# Patient Record
Sex: MALE | Race: WHITE | Employment: FULL TIME | URBAN - METROPOLITAN AREA
[De-identification: names, ages, dates, MRNs, and addresses within clinical notes are randomized per-mention and may not be internally consistent; named-entity substitution may affect disease eponyms.]

---

## 2018-03-11 ENCOUNTER — HOSPITAL ENCOUNTER (INPATIENT)
Age: 38
LOS: 1 days | Discharge: HOME | DRG: 189 | End: 2018-03-12
Attending: EMERGENCY MEDICINE | Admitting: INTERNAL MEDICINE
Payer: COMMERCIAL

## 2018-03-11 ENCOUNTER — APPOINTMENT (OUTPATIENT)
Dept: GENERAL RADIOLOGY | Age: 38
DRG: 189 | End: 2018-03-11
Attending: NURSE PRACTITIONER
Payer: COMMERCIAL

## 2018-03-11 DIAGNOSIS — J45.901 ASTHMA WITH ACUTE EXACERBATION, UNSPECIFIED ASTHMA SEVERITY, UNSPECIFIED WHETHER PERSISTENT: Primary | ICD-10-CM

## 2018-03-11 PROBLEM — J45.909 ASTHMA: Status: ACTIVE | Noted: 2018-03-11

## 2018-03-11 LAB
ALBUMIN SERPL-MCNC: 3.8 G/DL (ref 3.5–5)
ALBUMIN/GLOB SERPL: 1 {RATIO} (ref 1.1–2.2)
ALP SERPL-CCNC: 65 U/L (ref 45–117)
ALT SERPL-CCNC: 59 U/L (ref 12–78)
ANION GAP SERPL CALC-SCNC: 8 MMOL/L (ref 5–15)
AST SERPL-CCNC: 24 U/L (ref 15–37)
BASOPHILS # BLD: 0 K/UL (ref 0–0.1)
BASOPHILS NFR BLD: 0 % (ref 0–1)
BILIRUB SERPL-MCNC: 0.5 MG/DL (ref 0.2–1)
BUN SERPL-MCNC: 5 MG/DL (ref 6–20)
BUN/CREAT SERPL: 4 (ref 12–20)
CALCIUM SERPL-MCNC: 8.8 MG/DL (ref 8.5–10.1)
CHLORIDE SERPL-SCNC: 106 MMOL/L (ref 97–108)
CO2 SERPL-SCNC: 27 MMOL/L (ref 21–32)
CREAT SERPL-MCNC: 1.21 MG/DL (ref 0.7–1.3)
DIFFERENTIAL METHOD BLD: ABNORMAL
EOSINOPHIL # BLD: 0.5 K/UL (ref 0–0.4)
EOSINOPHIL NFR BLD: 4 % (ref 0–7)
ERYTHROCYTE [DISTWIDTH] IN BLOOD BY AUTOMATED COUNT: 13.2 % (ref 11.5–14.5)
FLUAV AG NPH QL IA: NEGATIVE
FLUBV AG NOSE QL IA: NEGATIVE
GLOBULIN SER CALC-MCNC: 3.9 G/DL (ref 2–4)
GLUCOSE SERPL-MCNC: 115 MG/DL (ref 65–100)
HCT VFR BLD AUTO: 45.9 % (ref 36.6–50.3)
HGB BLD-MCNC: 15.2 G/DL (ref 12.1–17)
IMM GRANULOCYTES # BLD: 0 K/UL
IMM GRANULOCYTES NFR BLD AUTO: 0 %
LYMPHOCYTES # BLD: 0.9 K/UL (ref 0.8–3.5)
LYMPHOCYTES NFR BLD: 7 % (ref 12–49)
MCH RBC QN AUTO: 30.2 PG (ref 26–34)
MCHC RBC AUTO-ENTMCNC: 33.1 G/DL (ref 30–36.5)
MCV RBC AUTO: 91.3 FL (ref 80–99)
MONOCYTES # BLD: 0.4 K/UL (ref 0–1)
MONOCYTES NFR BLD: 3 % (ref 5–13)
NEUTS BAND NFR BLD MANUAL: 1 % (ref 0–6)
NEUTS SEG # BLD: 11.3 K/UL (ref 1.8–8)
NEUTS SEG NFR BLD: 85 % (ref 32–75)
NRBC # BLD: 0 K/UL (ref 0–0.01)
NRBC BLD-RTO: 0 PER 100 WBC
PLATELET # BLD AUTO: 257 K/UL (ref 150–400)
PMV BLD AUTO: 8.7 FL (ref 8.9–12.9)
POTASSIUM SERPL-SCNC: 3.5 MMOL/L (ref 3.5–5.1)
PROT SERPL-MCNC: 7.7 G/DL (ref 6.4–8.2)
RBC # BLD AUTO: 5.03 M/UL (ref 4.1–5.7)
RBC MORPH BLD: ABNORMAL
SODIUM SERPL-SCNC: 141 MMOL/L (ref 136–145)
WBC # BLD AUTO: 13.1 K/UL (ref 4.1–11.1)

## 2018-03-11 PROCEDURE — 71046 X-RAY EXAM CHEST 2 VIEWS: CPT

## 2018-03-11 PROCEDURE — 74011636637 HC RX REV CODE- 636/637: Performed by: NURSE PRACTITIONER

## 2018-03-11 PROCEDURE — 36415 COLL VENOUS BLD VENIPUNCTURE: CPT | Performed by: PHYSICIAN ASSISTANT

## 2018-03-11 PROCEDURE — 94640 AIRWAY INHALATION TREATMENT: CPT

## 2018-03-11 PROCEDURE — 74011250636 HC RX REV CODE- 250/636: Performed by: INTERNAL MEDICINE

## 2018-03-11 PROCEDURE — 94660 CPAP INITIATION&MGMT: CPT

## 2018-03-11 PROCEDURE — 74011250636 HC RX REV CODE- 250/636: Performed by: PHYSICIAN ASSISTANT

## 2018-03-11 PROCEDURE — 96365 THER/PROPH/DIAG IV INF INIT: CPT

## 2018-03-11 PROCEDURE — 65270000029 HC RM PRIVATE

## 2018-03-11 PROCEDURE — 65660000001 HC RM ICU INTERMED STEPDOWN

## 2018-03-11 PROCEDURE — 87804 INFLUENZA ASSAY W/OPTIC: CPT | Performed by: PHYSICIAN ASSISTANT

## 2018-03-11 PROCEDURE — 74011000250 HC RX REV CODE- 250: Performed by: PHYSICIAN ASSISTANT

## 2018-03-11 PROCEDURE — 80053 COMPREHEN METABOLIC PANEL: CPT | Performed by: PHYSICIAN ASSISTANT

## 2018-03-11 PROCEDURE — 77030013033 HC MSK BPAP/CPAP MMKA -B

## 2018-03-11 PROCEDURE — 74011000250 HC RX REV CODE- 250: Performed by: NURSE PRACTITIONER

## 2018-03-11 PROCEDURE — 99284 EMERGENCY DEPT VISIT MOD MDM: CPT

## 2018-03-11 PROCEDURE — 85025 COMPLETE CBC W/AUTO DIFF WBC: CPT | Performed by: PHYSICIAN ASSISTANT

## 2018-03-11 RX ORDER — MORPHINE SULFATE 2 MG/ML
2 INJECTION, SOLUTION INTRAMUSCULAR; INTRAVENOUS
Status: DISCONTINUED | OUTPATIENT
Start: 2018-03-11 | End: 2018-03-12 | Stop reason: HOSPADM

## 2018-03-11 RX ORDER — MAGNESIUM SULFATE HEPTAHYDRATE 40 MG/ML
2 INJECTION, SOLUTION INTRAVENOUS ONCE
Status: COMPLETED | OUTPATIENT
Start: 2018-03-11 | End: 2018-03-11

## 2018-03-11 RX ORDER — ENOXAPARIN SODIUM 100 MG/ML
40 INJECTION SUBCUTANEOUS EVERY 24 HOURS
Status: DISCONTINUED | OUTPATIENT
Start: 2018-03-11 | End: 2018-03-12 | Stop reason: HOSPADM

## 2018-03-11 RX ORDER — SODIUM CHLORIDE 9 MG/ML
75 INJECTION, SOLUTION INTRAVENOUS CONTINUOUS
Status: DISCONTINUED | OUTPATIENT
Start: 2018-03-11 | End: 2018-03-12

## 2018-03-11 RX ORDER — ALBUTEROL SULFATE 0.83 MG/ML
SOLUTION RESPIRATORY (INHALATION)
Status: DISCONTINUED
Start: 2018-03-11 | End: 2018-03-12

## 2018-03-11 RX ORDER — SODIUM CHLORIDE 0.9 % (FLUSH) 0.9 %
5-10 SYRINGE (ML) INJECTION EVERY 8 HOURS
Status: DISCONTINUED | OUTPATIENT
Start: 2018-03-11 | End: 2018-03-12 | Stop reason: HOSPADM

## 2018-03-11 RX ORDER — IPRATROPIUM BROMIDE AND ALBUTEROL SULFATE 2.5; .5 MG/3ML; MG/3ML
SOLUTION RESPIRATORY (INHALATION)
Status: DISCONTINUED
Start: 2018-03-11 | End: 2018-03-12

## 2018-03-11 RX ORDER — SODIUM CHLORIDE 0.9 % (FLUSH) 0.9 %
5-10 SYRINGE (ML) INJECTION AS NEEDED
Status: DISCONTINUED | OUTPATIENT
Start: 2018-03-11 | End: 2018-03-12 | Stop reason: HOSPADM

## 2018-03-11 RX ORDER — ALBUTEROL SULFATE 0.83 MG/ML
5 SOLUTION RESPIRATORY (INHALATION)
Status: DISCONTINUED | OUTPATIENT
Start: 2018-03-11 | End: 2018-03-12

## 2018-03-11 RX ORDER — PREDNISONE 20 MG/1
60 TABLET ORAL
Status: COMPLETED | OUTPATIENT
Start: 2018-03-11 | End: 2018-03-11

## 2018-03-11 RX ORDER — KETOROLAC TROMETHAMINE 30 MG/ML
30 INJECTION, SOLUTION INTRAMUSCULAR; INTRAVENOUS
Status: DISCONTINUED | OUTPATIENT
Start: 2018-03-11 | End: 2018-03-11

## 2018-03-11 RX ORDER — KETOROLAC TROMETHAMINE 30 MG/ML
30 INJECTION, SOLUTION INTRAMUSCULAR; INTRAVENOUS
Status: COMPLETED | OUTPATIENT
Start: 2018-03-11 | End: 2018-03-11

## 2018-03-11 RX ADMIN — METHYLPREDNISOLONE SODIUM SUCCINATE 40 MG: 40 INJECTION, POWDER, FOR SOLUTION INTRAMUSCULAR; INTRAVENOUS at 17:44

## 2018-03-11 RX ADMIN — KETOROLAC TROMETHAMINE 30 MG: 30 INJECTION, SOLUTION INTRAMUSCULAR at 20:13

## 2018-03-11 RX ADMIN — ALBUTEROL SULFATE 2.5 MG: 2.5 SOLUTION RESPIRATORY (INHALATION) at 18:19

## 2018-03-11 RX ADMIN — ALBUTEROL SULFATE: 2.5 SOLUTION RESPIRATORY (INHALATION) at 15:37

## 2018-03-11 RX ADMIN — ALBUTEROL SULFATE: 2.5 SOLUTION RESPIRATORY (INHALATION) at 17:40

## 2018-03-11 RX ADMIN — MAGNESIUM SULFATE HEPTAHYDRATE 2 G: 40 INJECTION, SOLUTION INTRAVENOUS at 16:20

## 2018-03-11 RX ADMIN — PREDNISONE 60 MG: 20 TABLET ORAL at 14:38

## 2018-03-11 RX ADMIN — SODIUM CHLORIDE 75 ML/HR: 900 INJECTION, SOLUTION INTRAVENOUS at 18:14

## 2018-03-11 RX ADMIN — ALBUTEROL SULFATE 1 DOSE: 2.5 SOLUTION RESPIRATORY (INHALATION) at 14:39

## 2018-03-11 RX ADMIN — ALBUTEROL SULFATE 5 MG: 2.5 SOLUTION RESPIRATORY (INHALATION) at 23:16

## 2018-03-11 RX ADMIN — ALBUTEROL SULFATE: 2.5 SOLUTION RESPIRATORY (INHALATION) at 17:19

## 2018-03-11 RX ADMIN — ALBUTEROL SULFATE: 2.5 SOLUTION RESPIRATORY (INHALATION) at 15:21

## 2018-03-11 RX ADMIN — AZITHROMYCIN MONOHYDRATE 500 MG: 500 INJECTION, POWDER, LYOPHILIZED, FOR SOLUTION INTRAVENOUS at 18:14

## 2018-03-11 RX ADMIN — ALBUTEROL SULFATE 5 MG: 2.5 SOLUTION RESPIRATORY (INHALATION) at 21:02

## 2018-03-11 RX ADMIN — Medication 10 ML: at 17:45

## 2018-03-11 RX ADMIN — ENOXAPARIN SODIUM 40 MG: 40 INJECTION SUBCUTANEOUS at 17:44

## 2018-03-11 RX ADMIN — ALBUTEROL SULFATE: 2.5 SOLUTION RESPIRATORY (INHALATION) at 15:57

## 2018-03-11 RX ADMIN — ALBUTEROL SULFATE: 2.5 SOLUTION RESPIRATORY (INHALATION) at 18:10

## 2018-03-11 NOTE — H&P
[address]  ACUTE CARE HISTORY AND PHYSICAL    Mindy Zarate  MR#: [id number]  : 1980  ACCOUNT #: [de-identified]   DATE OF SERVICE: 2018    CHIEF COMPLAINT:  Increasing shortness of breath for the last 2 days. HISTORY OF PRESENT ILLNESS:  The patient is a 56-year-old male with a past medical history of asthma since as a child. Since the last 3 years, as per the patient, the asthma is becoming more frequent. About 2 days back, had started having some chills and possible low-grade temperature and increasing shortness of breath. Has a nebulizer machine at home, which the patient has been taking frequently, has taken 2 breathing treatments. In spite of that, continued to become short of breath and had diffuse wheezing. As per the patient, did not have any chest pain or chest pressure. Denies any nausea, vomiting, diarrhea. PAST MEDICAL HISTORY:  Asthma. ALLERGIES:  NO KNOWN ALLERGIES. MEDICATIONS:  Is on nebulizer treatment, otherwise none. FAMILY HISTORY:  Unremarkable. SOCIAL HISTORY:  Nonsmoker, no alcohol use. REVIEW OF SYSTEMS   CONSTITUTIONAL:  Positive fevers, positive chills. EYES:  Negative visual problems, negative eye pain. HEENT:  Negative sore throat. Negative earaches. Negative postnasal drip. PULMONARY:  Positive wheezing. Positive shortness of breath. CARDIOVASCULAR:  Negative chest pain. GASTROINTESTINAL:  Negative abdominal pain. Negative diarrhea. PHYSICAL EXAMINATION   VITAL SIGNS:  As follows:  Sats of 92%, pulse rate of 115, blood pressure 168/104, temperature of 97.9. HEAD, EYES, EARS, NOSE, THROAT:  Pupils were equal, react to light. Oral mucosa moist.  NECK:  Supple. LUNGS:  Wheezing noted bilaterally, diffuse wheezing was noted all over the chest.  CARDIAC:  S1, S2 audible. No S3, S4.  ABDOMEN:  Soft, nontender. EXTREMITIES:  There was no edema.     LABORATORY DATA:  Showed a WBC count of 13.1, neutrophils of 85%. The CMP was within normal limits. ASSESSMENT AND PLAN:  The patient is a 16-year-old male who is here for the asthma exacerbations. Patient has received nebulizer treatment, which will be continued every 2 hours. Will be admitted to the [facility] because of the increased shortness of breath. Will continue to monitor. Also patient received magnesium sulfate in the ER, will also add Solu-Medrol to the current regime. 1.  Deep venous thrombosis prophylaxis, was given Lovenox. CODE STATUS:  FULL CODE.       MD RASTA De La Fuente / ROMERO  D: 03/11/2018 17:42     T: 03/11/2018 18:24  JOB #: [id number]

## 2018-03-11 NOTE — ED PROVIDER NOTES
HPI Comments: 40 y/o male with PMHx of asthma, presenting with complaint of shortness of breath. He states he came down with a cold 2 days ago, with associated nasal congestion, rhinorrhea and cough. Last night however he began to feel short of breath and wheezy with associated chest tightness. Symptoms are consistent with previous asthma exacerbations. He reports having to be admitted to the hospital twice for asthma, but has never been intubated. His chest tightness is rated 7/10, is constant, and radiates to his back. He reports fever, chills, and light-headedness, but denies nausea, vomiting or syncope. The history is provided by the patient. No past medical history on file. No past surgical history on file. No family history on file. Social History     Social History    Marital status: N/A     Spouse name: N/A    Number of children: N/A    Years of education: N/A     Occupational History    Not on file. Social History Main Topics    Smoking status: Not on file    Smokeless tobacco: Not on file    Alcohol use Not on file    Drug use: Not on file    Sexual activity: Not on file     Other Topics Concern    Not on file     Social History Narrative         ALLERGIES: Review of patient's allergies indicates no known allergies. Review of Systems   Constitutional: Positive for chills and fever. HENT: Positive for congestion and rhinorrhea. Respiratory: Positive for cough, chest tightness and shortness of breath. Cardiovascular: Positive for chest pain (tightness). Gastrointestinal: Negative for abdominal pain, nausea and vomiting. Musculoskeletal: Negative for myalgias. Skin: Negative for wound. Neurological: Positive for light-headedness. Negative for syncope. All other systems reviewed and are negative.       Vitals:    03/11/18 1430 03/11/18 1434   BP:  (!) 168/104   Pulse: (!) 103 99   Resp: 24 22   Temp: 97.9 °F (36.6 °C) 97.9 °F (36.6 °C)   SpO2: 93% Weight: 86.2 kg (190 lb) 86.2 kg (190 lb)   Height: 5' 11\" (1.803 m) 5' 11\" (1.803 m)            Physical Exam   Constitutional: He is oriented to person, place, and time. He appears well-developed and well-nourished. No distress. HENT:   Head: Normocephalic and atraumatic. Eyes: Conjunctivae and EOM are normal.   Neck: Normal range of motion. Neck supple. Cardiovascular: Regular rhythm and normal heart sounds. Tachycardia present. Pulmonary/Chest:   Diffuse rhonchi, diminished air movement with expiratory wheeze. No crackles. Increased work of breathing, patient able to speak in 3-4 word sentences, but no acute distress. Musculoskeletal: Normal range of motion. Neurological: He is alert and oriented to person, place, and time. Skin: Skin is warm and dry. He is not diaphoretic. Nursing note and vitals reviewed. MDM  Number of Diagnoses or Management Options  Asthma with acute exacerbation, unspecified asthma severity, unspecified whether persistent:      Amount and/or Complexity of Data Reviewed  Clinical lab tests: ordered and reviewed  Tests in the radiology section of CPT®: ordered and reviewed  Discuss the patient with other providers: yes (Dr. Jacki aCson, ED attending)    Patient Progress  Patient progress: stable        ED Course       Procedures      40 y/o male with PMHx of asthma, presenting with complaint of shortness of breath. History and exam consistent with asthma exacerbation vs less likely pneumonia. Will give prednisone, nebs and will obtain CXR. Progress Note - 3:51 PM   CXR negative for pneumonia or other acute abnormalities. Patient reassessed after 1 hour continuous nebs - work of breathing somewhat improved but lung exam unchanged, patient denies any subjective improvement. Will give magnesium. Progress Note - 4:53 PM   Patient reassessed 1 hour after magnesium administered, no acute distress but symptoms still not improved.  Will consult the hospitalist service for admission. Consult Note - 5:28 PM   I have spoken with Dr. Guadalupe Szymanski. Discussed patient's presentation, history, physical assessment, and available diagnostic results. He will write orders and admit the patient to the hospital. All available results have been reviewed with the patient and any available family. Care plan has been outlined and questions have been answered. Patient and any available family understands and agrees to need for admission to hospital for further treatment not available in ED. Patient is ready for admission.

## 2018-03-11 NOTE — IP AVS SNAPSHOT
110 [facility] [address] 
[phone] Patient: Christopher Blevins MRN: [id number] PQP:[id number] You are allergic to the following No active allergies Recent Documentation Height Weight BMI  
  
  
 1.803 m 86.2 kg 26.5 kg/m2 Emergency Contacts  (Rel.) Home Phone Work Phone Mobile Phone Parker Gan (Brother) [phone] -- -- About your hospitalization You were admitted on:  March 11, 2018 You last received care in the:  [facility] You were discharged on:  March 12, 2018 Why you were hospitalized Your primary diagnosis was:  Not on File Your diagnoses also included:  Asthma Providers Seen During Your Hospitalization Provider Specialty Primary office phone Sandrita Esparza MD Emergency Medicine [phone] Briana Boast, MD Internal Medicine [phone] Kamilla Holland MD Family Practice [phone] Your Primary Care Physician (PCP) Primary Care Physician Office Phone Office Fax NONE ** None ** ** None ** Follow-up Information Follow up With Details Comments Contact Info [facility] [address]. In 1 month Please call to schedule appointment  101 [facility] [address] [address] Leonardo Tilley NP On 3/15/2018 Hospital follow up new PCP appointment on Thursday, 3/15/18 @ 9:20 a.m. Patient needs to arrive 15 minutes early with photo ID, insurance card and a listing of all medications. [address] [place] [address] [address] 
[phone] Appointments for Next 14 days 3/15/2018  9:20 AM  [facility] Internal Medicine My Medications TAKE these medications as instructed Instructions Each Dose to Equal  
 Morning Noon Evening Bedtime  
 azithromycin 250 mg tablet Commonly known as:  Jen Yu  
   
 Your last dose was: Your next dose is: Take 1 Tab by mouth daily for 4 days. 250 mg  
    
   
   
   
  
 fluticasone-salmeterol 250-50 mcg/dose diskus inhaler Commonly known as:  ADVAIR DISKUS Your last dose was: Your next dose is: Take 1 Puff by inhalation every twelve (12) hours. 1 Puff  
    
   
   
   
  
 montelukast 10 mg tablet Commonly known as:  SINGULAIR Your last dose was: Your next dose is: Take 1 Tab by mouth daily. 10 mg  
    
   
   
   
  
 predniSONE 10 mg tablet Commonly known as:  Merwyn Going Your last dose was: Your next dose is:    
   
   
 6 tabs daily for 2 days then 5 tabs daily for 2 days then   4 tabs daily for 2 days then   3 tabs daily for 1 days then  2 tabs daily for 1 days then  1 tab daily for 1 days Where to Get Your Medications Information on where to get these meds will be given to you by the nurse or doctor. ! Ask your nurse or doctor about these medications  
  azithromycin 250 mg tablet  
 fluticasone-salmeterol 250-50 mcg/dose diskus inhaler  
 montelukast 10 mg tablet  
 predniSONE 10 mg tablet Discharge Instructions Discharge Instructions PATIENT ID: Raymon Roldan MRN: [id number] YOB: 1980 DATE OF ADMISSION: 3/11/2018  2:33 PM   
DATE OF DISCHARGE: 3/12/2018 PRIMARY CARE PROVIDER: None ATTENDING PHYSICIAN: Yesy Akhtar MD 
DISCHARGING PROVIDER: Kapil Kc NP To contact this individual call [phone] and ask the  to page. If unavailable ask to be transferred the Adult Hospitalist Department. DISCHARGE DIAGNOSES Asthma Exacerbation CONSULTATIONS: IP CONSULT TO HOSPITALIST 
 
PROCEDURES/SURGERIES: * No surgery found * PENDING TEST RESULTS:  
At the time of discharge the following test results are still pending: none FOLLOW UP APPOINTMENTS:  
Follow-up Information Follow up With Details Comments Contact Info None   None (395) Patient stated that they have no PCP [facility] . In 1 month Please call to schedule appointment  101 [facility] [address] [address] ADDITIONAL CARE RECOMMENDATIONS:  
 
1. Finish entire course of steroid taper as prescribed . 2. Finish Z pack for empiric treatment of bronchitis. 3. Start taking Advair + Singulair daily for asthma treatment. Please schedule follow up with [facility]. 4. You need treatment of your high blood pressure, please establish care with primary care provider , you will likely need medication to treat your blood pressure. DIET: Resume previous diet ACTIVITY: Activity as tolerated DISCHARGE MEDICATIONS: 
 See Medication Reconciliation Form · It is important that you take the medication exactly as they are prescribed. · Keep your medication in the bottles provided by the pharmacist and keep a list of the medication names, dosages, and times to be taken in your wallet. · Do not take other medications without consulting your doctor. NOTIFY YOUR PHYSICIAN FOR ANY OF THE FOLLOWING:  
Fever over 101 degrees for 24 hours. Chest pain, shortness of breath, fever, chills, nausea, vomiting, diarrhea, change in mentation, falling, weakness, bleeding. Severe pain or pain not relieved by medications. Or, any other signs or symptoms that you may have questions about. DISPOSITION: 
 X Home With: none OT  PT  [facility]  RN  
  
 SNF/Inpatient Rehab/LTAC Independent/assisted living Hospice Other: CDMP Checked: Yes X PROBLEM LIST Updated: Yes X Signed:  
Lyla Arrieta NP 
3/12/2018 
2:29 PM 
 
 
 
  
 
Discharge Orders None Introducing [facility]!    
 [facility] introduces [employer] patient portal. Now you can access parts of your medical record, email your doctor's office, and request medication refills online. 1. In your internet browser, go to [email or web address] 2. Click on the First Time User? Click Here link in the Sign In box. You will see the New Member Sign Up page. 3. Enter your [employer] Access Code exactly as it appears below. You will not need to use this code after youve completed the sign-up process. If you do not sign up before the expiration date, you must request a new code. · [employer] Access Code: [email or web address] Expires: 6/10/2018  2:34 PM 
 
4. Enter the last four digits of your Social Security Number (xxxx) and Date of Birth (mm/dd/yyyy) as indicated and click Submit. You will be taken to the next sign-up page. 5. Create a [employer] ID. This will be your [employer] login ID and cannot be changed, so think of one that is secure and easy to remember. 6. Create a [employer] password. You can change your password at any time. 7. Enter your Password Reset Question and Answer. This can be used at a later time if you forget your password. 8. Enter your e-mail address. You will receive e-mail notification when new information is available in [address] 9. Click Sign Up. You can now view and download portions of your medical record. 10. Click the Download Summary menu link to download a portable copy of your medical information. If you have questions, please visit the Frequently Asked Questions section of the [employer] website. Remember, [employer] is NOT to be used for urgent needs. For medical emergencies, dial 911. Now available from your iPhone and Android! General Information Please provide this summary of care documentation to your next provider. Patient Signature:  ____________________________________________________________ Date:  ____________________________________________________________  
  
Francesca Sleeper  Provider Signature: ____________________________________________________________ Date:  ____________________________________________________________

## 2018-03-11 NOTE — ED NOTES
Hospitalist at bedside for eval and admission into hospital. Pt O2 92% but pt still working hard to breath, MD placing orders for BIPAP, resp notified. Visit Vitals    /69    Pulse (!) 117    Temp 97.8 °F (36.6 °C)    Resp 18    Ht 5' 11\" (1.803 m)    Wt 86.2 kg (190 lb)    SpO2 92%    BMI 26.5 kg/m2         1815  Pt feeling much better on BIPAP, pt appears to be in less distress and RR 12. Will continue to monitor. Provided pt pillow, pt denies any further needs at this time.

## 2018-03-11 NOTE — PROGRESS NOTES

## 2018-03-11 NOTE — ED TRIAGE NOTES
TRIAGE: Pt arrives with c/o SOB and cp starting last night, unable to speak in complete sentences. Sats 93%.  States his asthma 'acts up when he gets sick and has had a cold since Friday'

## 2018-03-12 VITALS
OXYGEN SATURATION: 92 % | HEART RATE: 83 BPM | TEMPERATURE: 97.6 F | DIASTOLIC BLOOD PRESSURE: 83 MMHG | SYSTOLIC BLOOD PRESSURE: 142 MMHG | WEIGHT: 190 LBS | RESPIRATION RATE: 16 BRPM | HEIGHT: 71 IN | BODY MASS INDEX: 26.6 KG/M2

## 2018-03-12 PROCEDURE — 74011250636 HC RX REV CODE- 250/636: Performed by: INTERNAL MEDICINE

## 2018-03-12 PROCEDURE — 94660 CPAP INITIATION&MGMT: CPT

## 2018-03-12 PROCEDURE — 94762 N-INVAS EAR/PLS OXIMTRY CONT: CPT

## 2018-03-12 PROCEDURE — 77030029684 HC NEB SM VOL KT MONA -A

## 2018-03-12 PROCEDURE — 94640 AIRWAY INHALATION TREATMENT: CPT

## 2018-03-12 PROCEDURE — 74011250637 HC RX REV CODE- 250/637: Performed by: NURSE PRACTITIONER

## 2018-03-12 PROCEDURE — 74011000250 HC RX REV CODE- 250: Performed by: INTERNAL MEDICINE

## 2018-03-12 PROCEDURE — 77010033678 HC OXYGEN DAILY

## 2018-03-12 RX ORDER — LISINOPRIL 10 MG/1
10 TABLET ORAL DAILY
Status: DISCONTINUED | OUTPATIENT
Start: 2018-03-12 | End: 2018-03-12 | Stop reason: HOSPADM

## 2018-03-12 RX ORDER — MONTELUKAST SODIUM 10 MG/1
10 TABLET ORAL DAILY
Qty: 30 TAB | Refills: 0 | Status: SHIPPED | OUTPATIENT
Start: 2018-03-12 | End: 2018-04-19 | Stop reason: SDUPTHER

## 2018-03-12 RX ORDER — AZITHROMYCIN 250 MG/1
250 TABLET, FILM COATED ORAL DAILY
Qty: 4 TAB | Refills: 0 | Status: SHIPPED | OUTPATIENT
Start: 2018-03-12 | End: 2018-03-16

## 2018-03-12 RX ORDER — FLUTICASONE PROPIONATE AND SALMETEROL 250; 50 UG/1; UG/1
1 POWDER RESPIRATORY (INHALATION) EVERY 12 HOURS
Qty: 1 INHALER | Refills: 0 | Status: SHIPPED | OUTPATIENT
Start: 2018-03-12 | End: 2018-04-19 | Stop reason: SDUPTHER

## 2018-03-12 RX ORDER — PREDNISONE 10 MG/1
TABLET ORAL
Qty: 36 TAB | Refills: 0 | Status: SHIPPED | OUTPATIENT
Start: 2018-03-12 | End: 2020-01-08 | Stop reason: ALTCHOICE

## 2018-03-12 RX ORDER — ALBUTEROL SULFATE 0.83 MG/ML
5 SOLUTION RESPIRATORY (INHALATION)
Status: DISCONTINUED | OUTPATIENT
Start: 2018-03-12 | End: 2018-03-12 | Stop reason: HOSPADM

## 2018-03-12 RX ADMIN — Medication 10 ML: at 13:22

## 2018-03-12 RX ADMIN — MORPHINE SULFATE 2 MG: 2 INJECTION, SOLUTION INTRAMUSCULAR; INTRAVENOUS at 00:06

## 2018-03-12 RX ADMIN — Medication 10 ML: at 00:06

## 2018-03-12 RX ADMIN — METHYLPREDNISOLONE SODIUM SUCCINATE 40 MG: 40 INJECTION, POWDER, FOR SOLUTION INTRAMUSCULAR; INTRAVENOUS at 05:30

## 2018-03-12 RX ADMIN — ALBUTEROL SULFATE 5 MG: 2.5 SOLUTION RESPIRATORY (INHALATION) at 01:03

## 2018-03-12 RX ADMIN — Medication 10 ML: at 05:31

## 2018-03-12 RX ADMIN — ALBUTEROL SULFATE 5 MG: 2.5 SOLUTION RESPIRATORY (INHALATION) at 07:51

## 2018-03-12 RX ADMIN — SODIUM CHLORIDE 75 ML/HR: 900 INJECTION, SOLUTION INTRAVENOUS at 00:14

## 2018-03-12 RX ADMIN — LISINOPRIL 10 MG: 10 TABLET ORAL at 15:10

## 2018-03-12 RX ADMIN — METHYLPREDNISOLONE SODIUM SUCCINATE 40 MG: 40 INJECTION, POWDER, FOR SOLUTION INTRAMUSCULAR; INTRAVENOUS at 00:06

## 2018-03-12 RX ADMIN — ALBUTEROL SULFATE 5 MG: 2.5 SOLUTION RESPIRATORY (INHALATION) at 03:52

## 2018-03-12 NOTE — PROGRESS NOTES
TRANSFER - IN REPORT:    Verbal report received from [address] [facility] [address]  RN(name) on Delisa Briseno  being received from [facility]) for routine progression of care      Report consisted of patients Situation, Background, Assessment and   Recommendations(SBAR). Information from the following report(s) SBAR, MAR, Recent Results and Med Rec Status was reviewed with the receiving nurse. Opportunity for questions and clarification was provided. Assessment completed upon patients arrival to unit and care assumed.

## 2018-03-12 NOTE — PROGRESS NOTES
TRANSFER - OUT REPORT:    Verbal report given to JEFFERSON Ramirez(name) on Mimi Jensen  being transferred to [facility](unit) for routine progression of care       Report consisted of patients Situation, Background, Assessment and   Recommendations(SBAR). Information from the following report(s) SBAR, Kardex, ED Summary, Intake/Output, MAR, Recent Results and Cardiac Rhythm NSR was reviewed with the receiving nurse. Lines:   Peripheral IV 03/11/18 Left Antecubital (Active)   Site Assessment Clean, dry, & intact 3/12/2018 11:14 AM   Phlebitis Assessment 0 3/12/2018 11:14 AM   Infiltration Assessment 0 3/12/2018 11:14 AM   Dressing Status Clean, dry, & intact 3/12/2018 11:14 AM   Dressing Type Transparent;Tape 3/12/2018 11:14 AM   Hub Color/Line Status Capped 3/12/2018 11:14 AM   Action Taken Open ports on tubing capped 3/12/2018 11:14 AM   Alcohol Cap Used Yes 3/12/2018 11:14 AM        Opportunity for questions and clarification was provided.       Patient transported with:   [place]

## 2018-03-12 NOTE — ROUTINE PROCESS
TRANSFER - OUT REPORT:    Verbal report given to Laura(name) on Lyubov Blas  being transferred to [facility]) for routine progression of care       Report consisted of patients Situation, Background, Assessment and   Recommendations(SBAR). Information from the following report(s) SBAR, ED Summary, Intake/Output, MAR and Recent Results was reviewed with the receiving nurse. Lines:   Peripheral IV 03/11/18 Left Antecubital (Active)   Site Assessment Clean, dry, & intact 3/11/2018  5:10 PM   Phlebitis Assessment 0 3/11/2018  5:10 PM   Infiltration Assessment 0 3/11/2018  5:10 PM   Dressing Status Clean, dry, & intact 3/11/2018  5:10 PM   Hub Color/Line Status Pink 3/11/2018  5:10 PM        Opportunity for questions and clarification was provided.       Patient transported with:  Monitor and RN and BIPAP

## 2018-03-12 NOTE — PROGRESS NOTES
Patient listed as not having a primary care physician. Hospital follow-up PCP transitional care appointment has been scheduled with Jerson Reyes NP for Thursday, 3/15/18 at 9:20 a.m. Patient needs to arrive 15 minutes early with photo ID, insurance card and a listing of all medications. Pending patient discharge.   Carl Blackwood, Care Management Specialist.

## 2018-03-12 NOTE — PROGRESS NOTES
2355  TRANSFER - IN REPORT:    Verbal report received from Salas Pérez (name) on Jonas Johnston  being received from ED(unit) for routine progression of care      Report consisted of patients Situation, Background, Assessment and   Recommendations(SBAR). Information from the following report(s) SBAR, Kardex and ED Summary was reviewed with the receiving nurse. Opportunity for questions and clarification was provided. Assessment completed upon patients arrival to unit and care assumed. Primary Nurse Robert Gómez RN and April, RN performed a dual skin assessment on this patient No impairment noted  Richard score is 23      0055  RT at bedside assessing pt.     0100  RT recommend modifying Q2 nebs to Q4hr based on pt assessment. MD paged. Albuterol 5mg treatments modified from scheduled Q2hr to Q4 hr. Will continue to monitor pt respiratory status closely. Pt on BIPAP resting comfortably, breathing is nonlabored. O2 sat 97%. 0200  RT took pt off BIPAP for a break. Pt stated he \"feels much better\". No longer has audible wheezing. Some expiratory wheezing noted with auscultation. Pt on room air, O2 sat 94%, breathing pattern regular. Will continue to monitor. 0730  Bedside and Verbal shift change report given to [address] JEFFERSON Liu (oncoming nurse) by Lana Kohli RN (offgoing nurse). Report included the following information SBAR, Kardex, ED Summary, Procedure Summary, Intake/Output, MAR, Recent Results and Cardiac Rhythm NSR/tach. Hourly rounding performed.

## 2018-03-12 NOTE — PROGRESS NOTES
Problem: Falls - Risk of  Goal: *Absence of Falls  Document Avelino Fall Risk and appropriate interventions in the flowsheet. Outcome: Progressing Towards Goal  Fall Risk Interventions:  Mobility Interventions: Assess mobility with egress test, Patient to call before getting OOB  Pt educated on fall prevention. Pt verbalizes understanding. Bedside avelino tool used with pt. Call bell and frequently used items within reach. Pt utilizes call bell appropriately for assistance. Problem: Asthma: Day 1  Goal: Medications  Outcome: Progressing Towards Goal  Pt receiving solumedrol and frequent albuterol treatments. Pt transferred to unit on BIPAP. After a few treatments and solumedrol administration pt is feeling much better and able to breathe more comfortably on room air. Goal: Respiratory  Outcome: Progressing Towards Goal  Pt transferred to unit with BIPAP orders. BIPAP worn for several hours which helped decrease pt work of breathing. At 2am pt requested to take off BIPAP. On room air pt breathing comfortably. No longer hear audible wheezing. Wheezing only heard on auscultation.

## 2018-03-12 NOTE — DISCHARGE INSTRUCTIONS
Discharge Instructions       PATIENT ID: Tiffanie Arce  MRN: [id number]   YOB: 1980    DATE OF ADMISSION: 3/11/2018  2:33 PM    DATE OF DISCHARGE: 3/12/2018    PRIMARY CARE PROVIDER: None     ATTENDING PHYSICIAN: Jackquline Goldberg, MD  DISCHARGING PROVIDER: Yessi Mccoy NP    To contact this individual call [phone] and ask the  to page. If unavailable ask to be transferred the Adult Hospitalist Department. DISCHARGE DIAGNOSES     Asthma Exacerbation     CONSULTATIONS: IP CONSULT TO HOSPITALIST    PROCEDURES/SURGERIES: * No surgery found *    PENDING TEST RESULTS:   At the time of discharge the following test results are still pending: none    FOLLOW UP APPOINTMENTS:   Follow-up Information     Follow up With Details Comments Contact Info    None   None (395) Patient stated that they have no PCP      [facility] . In 1 month Please call to schedule appointment  [facility]:     1. Finish entire course of steroid taper as prescribed . 2. Finish Z pack for empiric treatment of bronchitis. 3. Start taking Advair + Singulair daily for asthma treatment. Please schedule follow up with [facility]. 4. You need treatment of your high blood pressure, please establish care with primary care provider , you will likely need medication to treat your blood pressure. DIET: Resume previous diet    ACTIVITY: Activity as tolerated      DISCHARGE MEDICATIONS:   See Medication Reconciliation Form    · It is important that you take the medication exactly as they are prescribed. · Keep your medication in the bottles provided by the pharmacist and keep a list of the medication names, dosages, and times to be taken in your wallet. · Do not take other medications without consulting your doctor.        NOTIFY YOUR PHYSICIAN FOR ANY OF THE FOLLOWING:   Fever over 101 degrees for 24 hours. Chest pain, shortness of breath, fever, chills, nausea, vomiting, diarrhea, change in mentation, falling, weakness, bleeding. Severe pain or pain not relieved by medications. Or, any other signs or symptoms that you may have questions about. DISPOSITION:   X Home With: none   OT  PT  HH  RN       SNF/Inpatient Rehab/LTAC    Independent/assisted living    Hospice    Other:     CDMP Checked: Yes X     PROBLEM LIST Updated:   Yes X       Signed:   Susanne Herrera NP  3/12/2018  2:29 PM

## 2018-03-12 NOTE — CDMP QUERY
Please clarify if this patient is (was) being treated/managed for: Acute respiratory failure (POA) in the setting of Asthma with Acute Exacerbation; O2 Sats 89%, Resp. 22/24, SOB and unable to speak in complete sentences, requiring BiPAP and supplemental  Oxygen to maintain Sats >91%. => Other explanation of clinical findings  => Clinically Undetermined (no explanation for clinical findings)    The medical record reflects the following clinical findings, treatment, and risk factors. Risk Factors:  Asthma exacerbation    Clinical Indicators:  O2 Sats 89%, Resp. 22/24, SOB and unable to speak in complete sentences    Treatment: BiPAP and supplemental  Oxygen to maintain Sats >91%. Please clarify and document your clinical opinion in the progress notes and discharge summary including the definitive and/or presumptive diagnosis, (suspected or probable), related to the above clinical findings. Please include clinical findings supporting your diagnosis.     Thank you  Uma Stafford  [facility]  [phone]

## 2018-03-12 NOTE — PROGRESS NOTES
Primary Nurse Ilya Bustamante RN and Torie Aparicio RN performed a dual skin assessment on this patient No impairment noted  Richard score is 23

## 2018-03-12 NOTE — ADT AUTH CERT NOTES
Facility Name: [facility]            [facility], [address]  TLO:[id number]     Diagnosis       Asthma with acute exacerbation, unspecified asthma severity, unspecified whether persistent  ICD-10-CM: J45.901  ICD-9-CM: 493.92        Admit Provider: Dionicio Boxer, MD   NPI:  [id number]      Patient Demographics      Patient Name Sex  Address Phone     Shanon Billing Male 1980 [address] [phone] (Home)       Hospital Account      Name Acct ID Class Status Primary Coverage     Shanon Billing [id number] INPATIENT Open BLUE CROSS - [facility]              Guarantor Account (for Hospital Account [de-identified])      Name Relation to Pt Service Area Active? Acct Type     [facility] Yes Personal/Family     Address Phone           [phone] [phone](P)                Coverage Information (for Hospital Account [de-identified])      F/O Payor/Plan Subscriber  Subscriber Sex Precert #     [place] BL[place] OUT OF STATE [facility] CENTER      Subscriber Subscriber #     Shanon Billing [id number]     [facility] # Group Name     [id number]      Address Phone      NATHANIEL [address]      Policy Number Status Effective Date Benefits Phone     [id number] -  -     Auth/Cert                   Diagnosis         Codes Comments     Asthma with acute exacerbation, unspecified asthma severity, unspecified whether persistent  ICD-10-CM: J45.901  ICD-9-CM: 493.92        Admission Information - Patient Record Only      Arrival Date/Time: 2018 1426 Admit Date/Time: 2018 1433 IP Adm.  Date/Time: 2018 1734     Admission Type: Emergency Point of Origin: Non-health Care Facility/self Admit Category:      Means of Arrival: Car Primary Service: Medicine Secondary Service: N/A     Transfer Source:  Service Area: [address] [place] [address] Unit: [facility] 4 IMCU 2     Admit Provider: Jacob Connelly Juliana Bailey MD Attending Provider: Elbert Castleman, MD Referring Provider:        Admission Information      Attending Provider Admission Dx Admitted On     Madina Casillas MD  18     Service Isolation Code Status     MEDICINE  Full Code     Allergies           No Known Allergies         Admission Information      Unit/Bed: [address]  Service: MEDICINE     Admitting provider: Fly Sotomayor MD Phone: [phone]     Attending provider: Madina Casillas MD Phone: [phone]     PCP: None Phone:      Admission dx: Asthma Patient class: I     Admission type: ER         Patient Demographics      Patient Name [address] Sex  Address Phone   Francisco Barefoot Mickel Mortimer [id number] Male 1980 [address]  [address] [phone] (Home)       H&P Notes      H&P filed by Fly Sotomayor MD at 18 / Draft: Not Electronically Signed / documented on ED to Hosp-Admission (Current) from 3/11/2018 in [address] 2      Author: Fly Sotomayor MD Author Type: Physician Filed: 18     Completion Status: In Progress Availability: Unavailable Document ID: [id number]     Note Status: Unsigned Transcription Date of Service: 18       : Fly Sotomayor MD (Physician)          Transcription Details: H&P     Dictated By: Fly Sotomayor MD Dictated Date: Not found Dictated Time: Not found     Transcribed By: Not found Transcribed Date: 18 Transcribed Time:                       [facility]  ACUTE CARE HISTORY AND PHYSICAL     Shanae Luu  MR#: [id number]  : 1980  ACCOUNT #: [de-identified]   DATE OF SERVICE: 2018     CHIEF COMPLAINT:  Increasing shortness of breath for the last 2 days.     HISTORY OF PRESENT ILLNESS:  The patient is a 68-year-old male with a past medical history of asthma since as a child. Since the last 3 years, as per the patient, the asthma is becoming more frequent.   About 2 days back, had started having some chills and possible low-grade temperature and increasing shortness of breath. Has a nebulizer machine at home, which the patient has been taking frequently, has taken 2 breathing treatments. In spite of that, continued to become short of breath and had diffuse wheezing. As per the patient, did not have any chest pain or chest pressure. Denies any nausea, vomiting, diarrhea.     PAST MEDICAL HISTORY:  Asthma.     ALLERGIES:  NO KNOWN ALLERGIES.     MEDICATIONS:  Is on nebulizer treatment, otherwise none.       FAMILY HISTORY:  Unremarkable.     SOCIAL HISTORY:  Nonsmoker, no alcohol use.     REVIEW OF SYSTEMS   CONSTITUTIONAL:  Positive fevers, positive chills. EYES:  Negative visual problems, negative eye pain. HEENT:  Negative sore throat. Negative earaches. Negative postnasal drip. PULMONARY:  Positive wheezing. Positive shortness of breath. CARDIOVASCULAR:  Negative chest pain. GASTROINTESTINAL:  Negative abdominal pain. Negative diarrhea.      PHYSICAL EXAMINATION   VITAL SIGNS:  As follows:  Sats of 92%, pulse rate of 115, blood pressure 168/104, temperature of 97.9. HEAD, EYES, EARS, NOSE, THROAT:  Pupils were equal, react to light. Oral mucosa moist.  NECK:  Supple. LUNGS:  Wheezing noted bilaterally, diffuse wheezing was noted all over the chest.  CARDIAC:  S1, S2 audible. No S3, S4.  ABDOMEN:  Soft, nontender. EXTREMITIES:  There was no edema.     LABORATORY DATA:  Showed a WBC count of 13.1, neutrophils of 85%. The CMP was within normal limits.     ASSESSMENT AND PLAN:  The patient is a 29-year-old male who is here for the asthma exacerbations. Patient has received nebulizer treatment, which will be continued every 2 hours. Will be admitted to the [facility] because of the increased shortness of breath. Will continue to monitor. Also patient received magnesium sulfate in the ER, will also add Solu-Medrol to the current regime.     1.  Deep venous thrombosis prophylaxis, was given Lovenox.     CODE STATUS:  FULL CODE.        Robb Augustin MD         FR / PN  D: 2018 17:42     T: 2018 18:24  JOB #: [id number]                        Patient Demographics      Patient Name [address] Sex  Address Phone     Bruce Akhtar [id number] Male 1980 [address] [phone] (Home)         CSN:     [id number]         Admit Date: Admit Time Room Bed     Mar 11, 2018  2:33 PM [id number]       Attending Providers      Provider Pager From To     Gabo Mitchell MD  18     Ammon Turner MD  18     Nabeel Adame MD  18        Emergency Contact(s)      Name Relation Home Work Mobile     Parker Gan [phone]         Utilization Review         Asthma - Care Day 1 (3/11/2018) by Lanre Clark RN      Review Entered Review Status     3/12/2018 Completed     Details          Care Day: 1 Care Date: 3/11/2018 Level of Care: Intermediate Care     Guideline Day 1      Level Of Care     (X) Intermediate care, ICU, [C] or floor after emergency treatment     3/12/2018 9:01 AM EDT by Susanna Curran       Intermediate care               Clinical Status     (X) * Clinical Indications met [D]     (X) Wheezing, coughing, and dyspnea     3/12/2018 9:01 AM EDT by Susanna Curran       increasing SOB x 2 days               Activity     (X) Activity as tolerated     3/12/2018 9:01 AM EDT by Susanna Curran       up with assist               Routes     (X) IV fluids, medications     3/12/2018 9:01 AM EDT by Susanna Curran       Zithromax IV; Lovenox SC; toradol iv; mag iv;          (X) Diet as tolerated     3/12/2018 9:01 AM EDT by Susanna Curran       Regualr diet               Interventions     (X) Possible CXR     3/12/2018 9:01 AM EDT by Susanna Curran       IMPRESSION: No acute findings. Chronic appearing mild midthoracic vertebral compression fracture.  Clinical correlation recommended.          (X) Pulse oximetry     3/12/2018 9:01 AM EDT by Jelani Reynolds Aliyah Galvan       prn spot checks          (X) Possible oxygen     3/12/2018 9:01 AM EDT by Myron Riley       NC; Bipap 35% FiO2 started at 1822               Medications     (X) Inhaled  beta2-agonist     3/12/2018 9:01 AM EDT by Myron Riley       albuterol neb q20 min x7; Proventil neb q2h          (X) Systemic corticosteroids     3/12/2018 9:01 AM EDT by Myron Riley       prednisone po; Solu-medrol iv               3/12/2018 9:01 AM EDT by Myron Riley     Subject: Additional Clinical Information     VS: 97.8; 117; 18; 140/69; 92% 2L NC Labs: wbc 13.1; na 141; k 3.5; bun 5; cr 1.21                         * Milestone          Additional Notes     H&P:     DATE OF SERVICE: 03/11/2018           CHIEF COMPLAINT:  Increasing shortness of breath for the last 2 days.           HISTORY OF PRESENT ILLNESS:  The patient is a 82-year-old male with a past medical history of asthma since as a child. Shaila Silvestre the last 3 years, as per the patient, the asthma is becoming more frequent.  About 2 days back, had started having some chills and possible low-grade temperature and increasing shortness of breath.  Has a nebulizer machine at home, which the patient has been taking frequently, has taken 2 breathing treatments.  In spite of that, continued to become short of breath and had diffuse wheezing.  As per the patient, did not have any chest pain or chest pressure.  Denies any nausea, vomiting, diarrhea.          ASSESSMENT AND PLAN:  The patient is a 82-year-old male who is here for the asthma exacerbations.  Patient has received nebulizer treatment, which will be continued every 2 hours.  Will be admitted to the [facility] because of the increased shortness of breath.  Will continue to monitor.  Also patient received magnesium sulfate in the ER, will also add Solu-Medrol to the current regime.       1.  Deep venous thrombosis prophylaxis, was given Lovenox.           CODE STATUS:  FULL CODE.       Asthma - Clinical Indications for Admission to Inpatient Care by Aleksey Garcia RN      Review Entered Review Status     3/12/2018 Completed     Details          Clinical Indications for Admission to Inpatient Care     Most Recent : Yessy Villavicencio Most Recent Date: 3/12/2018 8:52 AM EDT     (X) Admission is indicated for 1 or more of the following  (1) (2) (3) (4):        (X) Hemodynamic instability        3/12/2018 8:52 AM EDT by Yessy Villavicencio          HR: 108; 114; 115; 117

## 2018-03-12 NOTE — ED NOTES
Bedside shift change report given to Kj Bedoya RN (oncoming nurse) by Rama Ricardo RN (offgoing nurse). Report included the following information SBAR, ED Summary, Intake/Output, MAR and Recent Results. Dinner tray made for pt and pt tolerated well and maintaining oxygen, pt medicated for back pain and awaiting hospital bed. Denies any further needs at this time.

## 2018-03-12 NOTE — PROGRESS NOTES
Problem: Falls - Risk of  Goal: *Absence of Falls  Document Avelino Fall Risk and appropriate interventions in the flowsheet.    Outcome: Progressing Towards Goal  Fall Risk Interventions:  Mobility Interventions: Patient to call before getting OOB         Medication Interventions: Teach patient to arise slowly

## 2018-03-13 NOTE — DISCHARGE SUMMARY
Discharge Summary       PATIENT ID: Kari Godoy  MRN: [id number]   YOB: 1980    DATE OF ADMISSION: 3/11/2018  2:33 PM    DATE OF DISCHARGE: 3/12/2018  PRIMARY CARE PROVIDER: None       DISCHARGING PHYSICIAN: Kade Banks NP    To contact this individual call [phone] and ask the  to page. If unavailable ask to be transferred the Adult Hospitalist Department. CONSULTATIONS: None    PROCEDURES/SURGERIES: * No surgery found *    [id number] [facility] COURSE:     The patient is a 61-year-old male with a past medical history of asthma since as a child. Since the last 3 years, as per the patient, the asthma is becoming more frequent. About 2 days back, had started having some chills and possible low-grade temperature and increasing shortness of breath. Has a nebulizer machine at home, which the patient has been taking frequently, has taken 2 breathing treatments. In spite of that, continued to become short of breath and had diffuse wheezing. As per the patient, did not have any chest pain or chest pressure. Denies any nausea, vomiting, diarrhea. On day of discharge Mr. Celia Gipson is feeling much better. He has been hospitalized for asthma exacerbation before and feels that he has improved enough to go home. He is having no wheezing on exam this afternoon, some posterior diminished breath sounds. He is able to walk and get around with minimal shortness of breath. He just moved here three weeks ago from [place] and feels he may need to start back on his chronic medications for asthma control ( Advair + singulair) . I gave him prescriptions for both with instructions to follow up with pulmonary. He was also given prescriptions for prednisone steroid taper, Azithromycin, and he was set up with a new PCP follow up appointment. He will need to have his blood pressure addressed and possibly started on medication for high blood pressure.      DISCHARGE DIAGNOSES / PLAN:      Acute Asthma Exacerbation:   - responded well to IV steroids and nebulizers   - advair + singulair scripts at discharge   - complete 5 days of Azithromycin for empiric bronchitis coverage   - 10 day steroid taper prescribed  - needs to establish with pulmonary here in [facility], information given for f/u     Acute respiratory failure (POA) in the setting of Asthma with Acute Exacerbation; O2 Sats 89%, Resp. 22/24, SOB and unable to speak in complete sentences, requiring BiPAP and supplemental  Oxygen to maintain Sats >91%.      Hypertension, undiagnosed:   - patient claims never having any issues with blood pressure and was being followed regularly with PCP in [place] . - will discharge with f/u with PCP for possible medication to be started   - SBP ranging 130-160 during admission        PENDING TEST RESULTS:   At the time of discharge the following test results are still pending: none    FOLLOW UP APPOINTMENTS:    Follow-up Information     Follow up With Details Comments Contact Info    [facility]. In 1 month Please call to schedule appointment  [facility] [address], NP On 3/15/2018 Hospital follow up new PCP appointment on Thursday, 3/15/18 @ 9:20 a.m. Patient needs to arrive 15 minutes early with photo ID, insurance card and a listing of all medications. [facility]  [phone]             ADDITIONAL CARE RECOMMENDATIONS:     1. Finish entire course of steroid taper as prescribed . 2. Finish Z pack for empiric treatment of bronchitis. 3. Start taking Advair + Singulair daily for asthma treatment. Please schedule follow up with [facility]. 4. You need treatment of your high blood pressure, please establish care with primary care provider , you will likely need medication to treat your blood pressure.      DIET: Resume previous diet    ACTIVITY: Activity as tolerated        DISCHARGE MEDICATIONS:  Discharge Medication List as of 3/12/2018  3:25 PM      START taking these medications    Details   predniSONE (DELTASONE) 10 mg tablet 6 tabs daily for 2 days then  5 tabs daily for 2 days then    4 tabs daily for 2 days then    3 tabs daily for 1 days then   2 tabs daily for 1 days then   1 tab daily for 1 days, Print, Disp-36 Tab, R-0      azithromycin (ZITHROMAX Z-LEAH) 250 mg tablet Take 1 Tab by mouth daily for 4 days. , Print, Disp-4 Tab, R-0      fluticasone-salmeterol (ADVAIR DISKUS) 250-50 mcg/dose diskus inhaler Take 1 Puff by inhalation every twelve (12) hours. , Print, Disp-1 Inhaler, R-0      montelukast (SINGULAIR) 10 mg tablet Take 1 Tab by mouth daily. , Print, Disp-30 Tab, R-0               NOTIFY YOUR PHYSICIAN FOR ANY OF THE FOLLOWING:   Fever over 101 degrees for 24 hours. Chest pain, shortness of breath, fever, chills, nausea, vomiting, diarrhea, change in mentation, falling, weakness, bleeding. Severe pain or pain not relieved by medications. Or, any other signs or symptoms that you may have questions about. DISPOSITION:  X  Home With: none   OT  PT  HH  RN       Long term SNF/Inpatient Rehab    Independent/assisted living    Hospice    Other:       PATIENT CONDITION AT DISCHARGE:     Functional status    Poor     Deconditioned    X Independent      Cognition    X Lucid     Forgetful     Dementia      Catheters/lines (plus indication)    Roy     PICC     PEG     None      Code status   X  Full code     DNR      PHYSICAL EXAMINATION AT DISCHARGE:    Physical Exam   Constitutional: He is oriented to person, place, and time. He appears well-developed and well-nourished. HENT:   Head: Normocephalic. Eyes: Pupils are equal, round, and reactive to light. Neck: Normal range of motion. Neck supple. Cardiovascular: Normal rate and regular rhythm. Exam reveals no gallop and no friction rub. No murmur heard.   Pulmonary/Chest: Effort normal. He has decreased breath sounds in the right upper field, the right middle field, the right lower field, the left upper field, the left middle field and the left lower field. Abdominal: Soft. Bowel sounds are normal.   Musculoskeletal: Normal range of motion. Neurological: He is alert and oriented to person, place, and time. Skin: Skin is warm and dry. Psychiatric: He has a normal mood and affect.          CHRONIC MEDICAL DIAGNOSES:  Problem List as of 3/12/2018  Never Reviewed          Codes Class Noted - Resolved    Asthma ICD-10-CM: X99.788  ICD-9-CM: 493.90  3/11/2018 - Present              Greater than 30 minutes were spent with the patient on counseling and coordination of care    Signed:   Eitan Oneal NP  3/13/2018  7:54 AM

## 2018-03-14 RX ORDER — PREDNISONE 20 MG/1
TABLET ORAL
Refills: 0 | COMMUNITY
Start: 2018-01-30 | End: 2018-03-15 | Stop reason: ALTCHOICE

## 2018-03-14 RX ORDER — ALBUTEROL SULFATE 0.83 MG/ML
SOLUTION RESPIRATORY (INHALATION)
Refills: 0 | COMMUNITY
Start: 2018-01-30 | End: 2018-03-15 | Stop reason: SDUPTHER

## 2018-03-15 ENCOUNTER — OFFICE VISIT (OUTPATIENT)
Dept: INTERNAL MEDICINE CLINIC | Age: 38
End: 2018-03-15

## 2018-03-15 VITALS
HEIGHT: 71 IN | RESPIRATION RATE: 18 BRPM | BODY MASS INDEX: 29.23 KG/M2 | WEIGHT: 208.8 LBS | OXYGEN SATURATION: 95 % | DIASTOLIC BLOOD PRESSURE: 81 MMHG | SYSTOLIC BLOOD PRESSURE: 130 MMHG | TEMPERATURE: 96.5 F | HEART RATE: 65 BPM

## 2018-03-15 DIAGNOSIS — J45.41 MODERATE PERSISTENT ASTHMA WITH ACUTE EXACERBATION: Primary | ICD-10-CM

## 2018-03-15 DIAGNOSIS — R03.0 ELEVATED BP WITHOUT DIAGNOSIS OF HYPERTENSION: ICD-10-CM

## 2018-03-15 RX ORDER — ALBUTEROL SULFATE 0.83 MG/ML
SOLUTION RESPIRATORY (INHALATION)
Qty: 24 EACH | Refills: 0 | Status: SHIPPED | OUTPATIENT
Start: 2018-03-15 | End: 2018-04-19 | Stop reason: SDUPTHER

## 2018-03-15 RX ORDER — ALBUTEROL SULFATE 90 UG/1
1 AEROSOL, METERED RESPIRATORY (INHALATION)
Qty: 1 INHALER | Refills: 1 | Status: SHIPPED | OUTPATIENT
Start: 2018-03-15 | End: 2020-01-08 | Stop reason: SDUPTHER

## 2018-03-15 NOTE — PATIENT INSTRUCTIONS
Asthma Attack: Care Instructions  Your Care Instructions    During an asthma attack, the airways swell and narrow. This makes it hard to breathe. Severe asthma attacks can be life-threatening, but you can help prevent them by keeping your asthma under control and treating symptoms before they get bad. Symptoms include being short of breath, having chest tightness, coughing, and wheezing. Noting and treating these symptoms can also help you avoid future trips to the emergency room. The doctor has checked you carefully, but problems can develop later. If you notice any problems or new symptoms, get medical treatment right away. Follow-up care is a key part of your treatment and safety. Be sure to make and go to all appointments, and call your doctor if you are having problems. It's also a good idea to know your test results and keep a list of the medicines you take. How can you care for yourself at home? · Follow your asthma action plan to prevent and treat attacks. If you don't have an asthma action plan, work with your doctor to create one. · Take your asthma medicines exactly as prescribed. Talk to your doctor right away if you have any questions about how to take them. ¨ Use your quick-relief medicine when you have symptoms of an attack. Quick-relief medicine is usually an albuterol inhaler. Some people need to use quick-relief medicine before they exercise. ¨ Take your controller medicine every day, not just when you have symptoms. Controller medicine is usually an inhaled corticosteroid. The goal is to prevent problems before they occur. Don't use your controller medicine to treat an attack that has already started. It doesn't work fast enough to help. ¨ If your doctor prescribed corticosteroid pills to use during an attack, take them exactly as prescribed. It may take hours for the pills to work, but they may make the episode shorter and help you breathe better.   ¨ Keep your quick-relief medicine with you at all times. · Talk to your doctor before using other medicines. Some medicines, such as aspirin, can cause asthma attacks in some people. · If you have a peak flow meter, use it to check how well you are breathing. This can help you predict when an asthma attack is going to occur. Then you can take medicine to prevent the asthma attack or make it less severe. · Do not smoke or allow others to smoke around you. Avoid smoky places. Smoking makes asthma worse. If you need help quitting, talk to your doctor about stop-smoking programs and medicines. These can increase your chances of quitting for good. · Learn what triggers an asthma attack for you, and avoid the triggers when you can. Common triggers include colds, smoke, air pollution, dust, pollen, mold, pets, cockroaches, stress, and cold air. · Avoid colds and the flu. Get a pneumococcal vaccine shot. If you have had one before, ask your doctor if you need a second dose. Get a flu vaccine every fall. If you must be around people with colds or the flu, wash your hands often. When should you call for help? Call 911 anytime you think you may need emergency care. For example, call if:  ? · You have severe trouble breathing. ?Call your doctor now or seek immediate medical care if:  ? · Your symptoms do not get better after you have followed your asthma action plan. ? · You have new or worse trouble breathing. ? · Your coughing and wheezing get worse. ? · You cough up dark brown or bloody mucus (sputum). ? · You have a new or higher fever. ? Watch closely for changes in your health, and be sure to contact your doctor if:  ? · You need to use quick-relief medicine on more than 2 days a week (unless it is just for exercise). ? · You cough more deeply or more often, especially if you notice more mucus or a change in the color of your mucus. ? · You are not getting better as expected. Where can you learn more?   Go to http://toño-vivek.info/. Enter M594 in the search box to learn more about \"Asthma Attack: Care Instructions. \"  Current as of: May 12, 2017  Content Version: 11.4  © 1966-3343 [employer]. Care instructions adapted under license by [employer] (which disclaims liability or warranty for this information). If you have questions about a medical condition or this instruction, always ask your healthcare professional. [address] any warranty or liability for your use of this information. Learning About Asthma Triggers  What are asthma triggers? When you have asthma, certain things can make your symptoms worse. These are called triggers. Learn what triggers an asthma attack for you, and avoid the triggers when you can. Common triggers include colds, smoke, air pollution, dust, pollen, pets, stress, and cold air. How do asthma triggers affect you? Triggers can make it harder for your lungs to work as they should. They can lead to sudden breathing problems and other symptoms. When you are around a trigger, an asthma attack is more likely. If your symptoms are severe, you may need emergency treatment or have to go to the hospital for treatment. What can you do to avoid triggers? The first thing is to know your triggers. When you are having symptoms, note the things around you that might be causing them. Then look for patterns that may be triggering your symptoms. Record your triggers on a piece of paper or in an asthma diary. When you have your list of possible triggers, work with your doctor to find ways to avoid them. Avoid colds and flu. Get a pneumococcal vaccine shot. If you have had one before, ask your doctor whether you need a second dose. Get a flu vaccine every year, as soon as it's available. If you must be around people with colds or the flu, wash your hands often. Here are some ways to avoid a few common triggers.   · Do not smoke or allow others to smoke around you. If you need help quitting, talk to your doctor about stop-smoking programs and medicines. These can increase your chances of quitting for good. · If there is a lot of pollution, pollen, or dust outside, stay at home and keep your windows closed. Use an air conditioner or air filter in your home. Check your local weather report or newspaper for air quality and pollen reports. What else should you know? · Take your controller medicine every day, not just when you have symptoms. It helps prevent problems before they occur. · Your doctor may suggest that you check how well your lungs are working by measuring your peak expiratory flow (PEF) throughout the day. Your PEF may drop when you are near things that trigger symptoms. Where can you learn more? Go to http://toño-vivek.info/. Enter Z301 in the search box to learn more about \"Learning About Asthma Triggers. \"  Current as of: May 12, 2017  Content Version: 11.4  © 8612-2961 [employer]. Care instructions adapted under license by [employer] (which disclaims liability or warranty for this information). If you have questions about a medical condition or this instruction, always ask your healthcare professional. [address] any warranty or liability for your use of this information. Asthma: Your Action Plan  Sample Action Plan    Controller medicine action plan  Fill in the blank spaces and boxes that apply for all sections. · Name of your controller medicine:  ¨ ____________________________________________  · How much of this medicine do you take? ¨ ____________________________________________  · How often do you take this medicine? ¨ ____________________________________________  · Other instructions?   ¨ ____________________________________________  Quick-relief medicine action plan  · Name of your quick-relief medicine:  ¨ ____________________________________________  · How much of this medicine do you take? ¨ ____________________________________________  · How often do you take this medicine? ¨ ____________________________________________  Asthma Zones  GREEN ZONE: This is where you want to be! Green zone symptoms  · You have no shortness of breath or chest tightness. You are not coughing or wheezing. · You can do all of your usual activities. · You sleep well at night. Green zone peak flow (if you use a peak flow meter)  · ______ or more (80% or more of your personal best)  Green zone actions (Check the boxes and fill in the blank spaces that apply.)  [ ] You take your controller medicine(s) every day. [ ] [place] are staying away from your asthma triggers. [ ] You take quick-relief medicine (called _____________________) ______ minutes before exercise. YELLOW ZONE: Your asthma is getting worse. Yellow zone symptoms  · You are short of breath or have chest tightness. You are coughing or wheezing. · You have symptoms that keep you up at night. · You can do some, but not all, of your usual activities. Yellow zone peak flow (if you use a peak flow meter)  · ______ to ______ (50% to 79% of your personal best)  Yellow zone actions (Check the boxes and fill in the blank spaces that apply.)  [ ] Take _____ puff(s) of quick-relief medicine called ______________________. Repeat _____ times. [ ] If your symptoms don't get better or your peak flow has not returned to the green zone in 1 hour, then:  · [ ] Take _____ puff(s) of medicine called ______________________. Take it ____ times a day. · [ ] Begin or increase treatment with corticosteroid pills. Take ______ mg of medicine called ____________________________ every __________. · [ ] Call your doctor at this number: ____________________. RED ZONE: Danger! Red zone symptoms  · You are very short of breath. · You can't do your usual activities.   · Quick-relief medicine doesn't help. Or your symptoms don't get better after 24 hours in the yellow zone. Red zone peak flow (if you use a peak flow meter)  · Less than _______ (less than 50% of your personal best)  Red zone actions (Check the boxes and fill in the blank spaces that apply.)  [ ] Take _____ puff(s) of quick-relief medicine called ____________________________. Repeat ______ times. [ ] Begin or increase treatment with corticosteroid pills. Take ________ mg now. [ ] Call your doctor at this number: _________________. If you can't contact your doctor, go to the emergency department. Call 911 or ___________________. [ ] Other numbers you might call are: ___________________________________. When should you call for help? Call 911 anytime you think you may need emergency care. For example, call if:  · You have severe trouble breathing. Call your doctor now or seek immediate medical care if:  · You are in the red zone of your asthma action plan. · You've used your quick-relief medicine but are still having trouble breathing. · You cough up blood. · You have new or worse trouble breathing. · You cough up dark brown or bloody mucus (sputum). Watch closely for changes in your health, and be sure to contact your doctor if:  · You need to use quick-relief medicine more than 2 days each week (unless it's just for exercise). · Your coughing and wheezing get worse. Follow-up care is a key part of your treatment and safety. Be sure to make and go to all appointments, and call your doctor if you are having problems. It's also a good idea to know your test results and keep a list of the medicines you take. Where can you learn more? Go to http://toño-vivek.info/. Enter 94 05 61 in the search box to learn more about \"Asthma: Your Action Plan. \"  Current as of: May 12, 2017  Content Version: 11.4  © 9974-7331 [employer].  Care instructions adapted under license by [employer] (which disclaims liability or warranty for this information). If you have questions about a medical condition or this instruction, always ask your healthcare professional. [address] any warranty or liability for your use of this information.

## 2018-03-15 NOTE — PROGRESS NOTES
Subjective:     Chief Complaint   Patient presents with    Follow-up     [facility], 3/11-3/12/18, Bronchitis, Asthmatic Attach    Hypertension     Concerns BP high    Back Pain       History of Present Illness    Cindy Cortez is a 41y.o. year old male who presents as a new patient to establish care. He moved to [address] [place] [address] 3 weeks ago for work. On 03/11/18 he was admitted to [facility] for an asthma exacerbation. He has a hx of the same. Never had to be intubated. He states while living in [place] his pulmonologist took him off of his Advair and told him he didn't need it anymore. In the hospital he was given nebs, IV steroids, and mag. During his admission his BP was elevated. He denies a hx of HTN. His BP is stable today. Will continue to monitor. He was discharged home with a medrol dose pack, singular, Advair, and a z-pack. He reports feeling better but continues to have a dry cough. NAD. He denies any other new complaints at this time. No CP, SOB, GI, or  symptoms. Reviewed medications, recent lab work and imaging with patient. Pt reports compliance with medications. Current Outpatient Prescriptions on File Prior to Visit   Medication Sig Dispense Refill    albuterol (PROVENTIL VENTOLIN) 2.5 mg /3 mL (0.083 %) nebulizer solution INHALE 3 ML 3 TIMES A DAY BY NEBULIZATION ROUTE.  0    predniSONE (DELTASONE) 10 mg tablet 6 tabs daily for 2 days then  5 tabs daily for 2 days then    4 tabs daily for 2 days then    3 tabs daily for 1 days then   2 tabs daily for 1 days then   1 tab daily for 1 days 36 Tab 0    azithromycin (ZITHROMAX Z-LEAH) 250 mg tablet Take 1 Tab by mouth daily for 4 days. 4 Tab 0    fluticasone-salmeterol (ADVAIR DISKUS) 250-50 mcg/dose diskus inhaler Take 1 Puff by inhalation every twelve (12) hours. 1 Inhaler 0    montelukast (SINGULAIR) 10 mg tablet Take 1 Tab by mouth daily. 30 Tab 0     No current facility-administered medications on file prior to visit.         No Known Allergies   Past Medical History:   Diagnosis Date    Anemia     Hypertension       History reviewed. No pertinent surgical history. Social History   Substance Use Topics    Smoking status: Never Smoker    Smokeless tobacco: Never Used    Alcohol use 0.6 oz/week     1 Cans of beer per week      Family History   Problem Relation Age of Onset    Asthma Mother     Cancer Mother     Hypertension Mother     Asthma Sister         Objective:     Vitals:    03/15/18 0925   BP: 130/81   Pulse: 65   Resp: 18   Temp: 96.5 °F (35.8 °C)   TempSrc: Oral   SpO2: 95%   Weight: 208 lb 12.8 oz (94.7 kg)   Height: 5' 11\" (1.803 m)       Review of Systems   Constitutional: Negative. HENT: Negative. Eyes: Negative. Respiratory: Positive for cough. Cardiovascular: Negative. Gastrointestinal: Negative. Genitourinary: Negative. Musculoskeletal: Negative. Skin: Negative. Neurological: Negative. Psychiatric/Behavioral: Negative. Physical Exam   Constitutional: He is oriented to person, place, and time. He appears well-developed and well-nourished. No distress. Well-appearing  male. NAD   HENT:   Head: Normocephalic and atraumatic. Eyes: Conjunctivae and EOM are normal. Pupils are equal, round, and reactive to light. Neck: Normal range of motion. Neck supple. Cardiovascular: Normal rate, regular rhythm and normal heart sounds. Pulmonary/Chest: Effort normal and breath sounds normal. No respiratory distress. He has no wheezes. Dry cough. Abdominal: He exhibits no distension. Musculoskeletal: Normal range of motion. He exhibits no edema, tenderness or deformity. Neurological: He is alert and oriented to person, place, and time. Skin: Skin is warm and dry. No rash noted. He is not diaphoretic. No erythema. No pallor. Psychiatric: He has a normal mood and affect. His behavior is normal.   Nursing note and vitals reviewed.       Assessment/ Plan:   Diagnoses and all orders for this visit:    1. Moderate persistent asthma with acute exacerbation   Will refill  -     albuterol (PROVENTIL VENTOLIN) 2.5 mg /3 mL (0.083 %) nebulizer solution; INHALE 3 ML 3 TIMES A DAY BY NEBULIZATION ROUTE.  -     albuterol (PROVENTIL HFA, VENTOLIN HFA, PROAIR HFA) 90 mcg/actuation inhaler; Take 1 Puff by inhalation every four (4) hours as needed for Wheezing. Continue with Advair, Singular, and medrol dose pack as prescribed. 2. Elevated BP without diagnosis of hypertension   BP stable in office today. Patient's plan of care has been reviewed with them. Patient and/or family have verbally conveyed their understanding and agreement of the patient's signs, symptoms, diagnosis, treatment and prognosis and additionally agree to follow up as recommended or return to [facility] should their condition change prior to follow-up. Discharge instructions have also been provided to the patient with some educational information regarding their diagnosis as well a list of reasons why they would want to return to the office prior to their follow-up appointment should their condition change. Follow-up in 4 months.

## 2018-03-15 NOTE — PROGRESS NOTES
Health Maintenance Due   Topic Date Due    Pneumococcal 19-64 Medium Risk (1 of 1 - PPSV23) 01/23/2001    DTaP/Tdap/Td series (1 - Tdap) 01/23/2003    Influenza Age 5 to Adult  08/01/2017       Chief Complaint   Patient presents with    Follow-up     [facility], 3/11-3/12/18, Bronchitis, Asthmatic Attach    Hypertension     Concerns BP high    Back Pain       1. Have you been to the ER, urgent care clinic since your last visit? Hospitalized since your last visit? Yes When: 3/11/18 Where: [facility] Reason for visit: Bronchitis & Asthma    2. Have you seen or consulted any other health care providers outside of the [facility] since your last visit? Include any pap smears or colon screening. No    3) Do you have an Advance Directive on file? no    4) Are you interested in receiving information on Advance Directives? NO      Patient is accompanied by self I have received verbal consent from Maryjane Villatoro to discuss any/all medical information while they are present in the room.

## 2018-03-15 NOTE — MR AVS SNAPSHOT
[address] [place] [address] [address] [facility] 13 
[phone] Patient: Vishnu Farris MRN: [id number] KTB:[id number] Visit Information Date & Time Provider Department Dept. Phone Encounter #  
 3/15/2018  9:20 AM Luz Beaulieu NP [facility] [phone] [id number] Upcoming Health Maintenance Date Due Pneumococcal 19-64 Medium Risk (1 of 1 - PPSV23) 1/23/2001 DTaP/Tdap/Td series (1 - Tdap) 1/23/2003 Influenza Age 5 to Adult 8/1/2017 Allergies as of 3/15/2018  Review Complete On: 3/15/2018 By: Luz Beaulieu NP No Known Allergies Current Immunizations  Never Reviewed No immunizations on file. Not reviewed this visit You Were Diagnosed With   
  
 Codes Comments Moderate persistent asthma with acute exacerbation    -  Primary ICD-10-CM: J45.41 
ICD-9-CM: 493.92 Vitals BP Pulse Temp Resp Height(growth percentile) Weight(growth percentile) 130/81 (BP 1 Location: Right arm, BP Patient Position: Sitting) 65 96.5 °F (35.8 °C) (Oral) 18 5' 11\" (1.803 m) 208 lb 12.8 oz (94.7 kg) SpO2 BMI Smoking Status 95% 29.12 kg/m2 Never Smoker Vitals History BMI and BSA Data Body Mass Index Body Surface Area  
 29.12 kg/m 2 2.18 m 2 Preferred Pharmacy Pharmacy Name Phone [id number] [place], [address] [facility] [phone] Your Updated Medication List  
  
   
This list is accurate as of 3/15/18  9:48 AM.  Always use your most recent med list.  
  
  
  
  
 * albuterol 2.5 mg /3 mL (0.083 %) nebulizer solution Commonly known as:  PROVENTIL VENTOLIN  
INHALE 3 ML 3 TIMES A DAY BY NEBULIZATION ROUTE. * albuterol 90 mcg/actuation inhaler Commonly known as:  PROVENTIL HFA, VENTOLIN HFA, PROAIR HFA Take 1 Puff by inhalation every four (4) hours as needed for Wheezing. azithromycin 250 mg tablet Commonly known as:  Sukhdeep Lush Take 1 Tab by mouth daily for 4 days. fluticasone-salmeterol 250-50 mcg/dose diskus inhaler Commonly known as:  ADVAIR DISKUS Take 1 Puff by inhalation every twelve (12) hours. montelukast 10 mg tablet Commonly known as:  SINGULAIR Take 1 Tab by mouth daily. predniSONE 10 mg tablet Commonly known as:  DELTASONE  
6 tabs daily for 2 days then 5 tabs daily for 2 days then   4 tabs daily for 2 days then   3 tabs daily for 1 days then  2 tabs daily for 1 days then  1 tab daily for 1 days * Notice: This list has 2 medication(s) that are the same as other medications prescribed for you. Read the directions carefully, and ask your doctor or other care provider to review them with you. Prescriptions Sent to Pharmacy Refills  
 albuterol (PROVENTIL VENTOLIN) 2.5 mg /3 mL (0.083 %) nebulizer solution 0 Sig: INHALE 3 ML 3 TIMES A DAY BY NEBULIZATION ROUTE. Class: Normal  
 Pharmacy: [facility]/pharmacy #[id number] Ph #: [phone]  
 albuterol (PROVENTIL HFA, VENTOLIN HFA, PROAIR HFA) 90 mcg/actuation inhaler 1 Sig: Take 1 Puff by inhalation every four (4) hours as needed for Wheezing. Class: Normal  
 Pharmacy: [address] [place] [address] Ph #: [phone] Route: Inhalation Introducing [facility]! Dear Ethan Bennett: 
Thank you for requesting a [employer] account. Our records indicate that you already have an active [employer] account. You can access your account anytime at [email or web address]/[email or web address] Did you know that you can access your hospital and ER discharge instructions at any time in [employer]? You can also review all of your test results from your hospital stay or ER visit. Additional Information If you have questions, please visit the Frequently Asked Questions section of the [employer] website at [email or web address] Remember, MyChart is NOT to be used for urgent needs. For medical emergencies, dial 911. Now available from your iPhone and Android! Please provide this summary of care documentation to your next provider. Your primary care clinician is listed as Kandace Ramesh. If you have any questions after today's visit, please call [phone].

## 2018-04-19 DIAGNOSIS — J45.41 MODERATE PERSISTENT ASTHMA WITH ACUTE EXACERBATION: ICD-10-CM

## 2018-04-20 RX ORDER — ALBUTEROL SULFATE 0.83 MG/ML
SOLUTION RESPIRATORY (INHALATION)
Qty: 24 EACH | Refills: 0 | Status: SHIPPED | OUTPATIENT
Start: 2018-04-20 | End: 2018-08-28 | Stop reason: SDUPTHER

## 2018-04-20 RX ORDER — MONTELUKAST SODIUM 10 MG/1
10 TABLET ORAL DAILY
Qty: 30 TAB | Refills: 0 | Status: SHIPPED | OUTPATIENT
Start: 2018-04-20 | End: 2018-06-09 | Stop reason: SDUPTHER

## 2018-04-20 RX ORDER — FLUTICASONE PROPIONATE AND SALMETEROL 250; 50 UG/1; UG/1
1 POWDER RESPIRATORY (INHALATION) EVERY 12 HOURS
Qty: 1 INHALER | Refills: 0 | Status: SHIPPED | OUTPATIENT
Start: 2018-04-20 | End: 2018-06-09 | Stop reason: SDUPTHER

## 2018-06-11 RX ORDER — MONTELUKAST SODIUM 10 MG/1
TABLET ORAL
Qty: 30 TAB | Refills: 0 | Status: SHIPPED | OUTPATIENT
Start: 2018-06-11 | End: 2018-07-09 | Stop reason: SDUPTHER

## 2018-06-11 RX ORDER — FLUTICASONE PROPIONATE AND SALMETEROL 50; 250 UG/1; UG/1
POWDER RESPIRATORY (INHALATION)
Qty: 60 EACH | Refills: 0 | Status: SHIPPED | OUTPATIENT
Start: 2018-06-11 | End: 2020-01-08 | Stop reason: SDUPTHER

## 2018-07-09 RX ORDER — MONTELUKAST SODIUM 10 MG/1
TABLET ORAL
Qty: 30 TAB | Refills: 0 | Status: SHIPPED | OUTPATIENT
Start: 2018-07-09 | End: 2018-08-07 | Stop reason: SDUPTHER

## 2018-08-07 RX ORDER — MONTELUKAST SODIUM 10 MG/1
TABLET ORAL
Qty: 30 TAB | Refills: 0 | Status: SHIPPED | OUTPATIENT
Start: 2018-08-07 | End: 2018-10-15 | Stop reason: SDUPTHER

## 2018-08-28 DIAGNOSIS — J45.41 MODERATE PERSISTENT ASTHMA WITH ACUTE EXACERBATION: ICD-10-CM

## 2018-08-28 RX ORDER — ALBUTEROL SULFATE 0.83 MG/ML
SOLUTION RESPIRATORY (INHALATION)
Qty: 25 EACH | Refills: 1 | Status: SHIPPED | OUTPATIENT
Start: 2018-08-28 | End: 2018-11-05 | Stop reason: SDUPTHER

## 2018-10-15 RX ORDER — MONTELUKAST SODIUM 10 MG/1
TABLET ORAL
Qty: 30 TAB | Refills: 0 | Status: SHIPPED | OUTPATIENT
Start: 2018-10-15 | End: 2018-12-08 | Stop reason: SDUPTHER

## 2018-11-05 DIAGNOSIS — J45.41 MODERATE PERSISTENT ASTHMA WITH ACUTE EXACERBATION: ICD-10-CM

## 2018-11-05 RX ORDER — ALBUTEROL SULFATE 0.83 MG/ML
SOLUTION RESPIRATORY (INHALATION)
Qty: 180 EACH | Refills: 1 | Status: SHIPPED | OUTPATIENT
Start: 2018-11-05 | End: 2020-01-08 | Stop reason: SDUPTHER

## 2018-12-10 RX ORDER — MONTELUKAST SODIUM 10 MG/1
TABLET ORAL
Qty: 30 TAB | Refills: 0 | Status: SHIPPED | OUTPATIENT
Start: 2018-12-10 | End: 2020-01-08 | Stop reason: SDUPTHER

## 2020-01-08 ENCOUNTER — OFFICE VISIT (OUTPATIENT)
Dept: INTERNAL MEDICINE CLINIC | Age: 40
End: 2020-01-08

## 2020-01-08 VITALS
TEMPERATURE: 97.4 F | HEIGHT: 71 IN | RESPIRATION RATE: 18 BRPM | HEART RATE: 66 BPM | WEIGHT: 211 LBS | DIASTOLIC BLOOD PRESSURE: 80 MMHG | SYSTOLIC BLOOD PRESSURE: 118 MMHG | OXYGEN SATURATION: 96 % | BODY MASS INDEX: 29.54 KG/M2

## 2020-01-08 DIAGNOSIS — Z00.00 ROUTINE ADULT HEALTH MAINTENANCE: ICD-10-CM

## 2020-01-08 DIAGNOSIS — E66.3 OVERWEIGHT (BMI 25.0-29.9): ICD-10-CM

## 2020-01-08 DIAGNOSIS — G47.30 SLEEP DISORDER BREATHING: ICD-10-CM

## 2020-01-08 DIAGNOSIS — R05.9 COUGH: ICD-10-CM

## 2020-01-08 DIAGNOSIS — J45.41 MODERATE PERSISTENT ASTHMA WITH ACUTE EXACERBATION: Primary | ICD-10-CM

## 2020-01-08 RX ORDER — MONTELUKAST SODIUM 10 MG/1
TABLET ORAL
Qty: 30 TAB | Refills: 0 | Status: SHIPPED | OUTPATIENT
Start: 2020-01-08 | End: 2020-02-04

## 2020-01-08 RX ORDER — ALBUTEROL SULFATE 0.83 MG/ML
SOLUTION RESPIRATORY (INHALATION)
Qty: 180 EACH | Refills: 1 | Status: SHIPPED | OUTPATIENT
Start: 2020-01-08 | End: 2021-01-15

## 2020-01-08 RX ORDER — ALBUTEROL SULFATE 90 UG/1
1 AEROSOL, METERED RESPIRATORY (INHALATION)
Qty: 1 INHALER | Refills: 1 | Status: SHIPPED | OUTPATIENT
Start: 2020-01-08 | End: 2021-01-15

## 2020-01-08 RX ORDER — FLUTICASONE PROPIONATE AND SALMETEROL 250; 50 UG/1; UG/1
POWDER RESPIRATORY (INHALATION)
Qty: 60 EACH | Refills: 0 | Status: SHIPPED | OUTPATIENT
Start: 2020-01-08 | End: 2020-02-04

## 2020-01-08 NOTE — PROGRESS NOTES
HISTORY OF PRESENT ILLNESS  Royal Chappell is a 42 y.o. male with a history of asthma that presents today for a follow up, asthma exacerbations and medication refill. Patient reports that in the last several weeks he has been having trouble not coughing, short of breath and increased wheezing during the night. States that in the last weeks he has been having to use his albuterol every night and multiple times throughout the day. States that within 10 minutes it becomes controlled, but the use has picked up. Reports an increase in snoring and partner states that it sounds like he is having \"labored breathing: when lying flat. States that sitting up makes it better. Was on Advair 2 times in the past and was weaned off. Tolerated and did well on it   Denies CP, GI or  concerns. Denies leg swelling   Received the flu shot  Denies fever and chills   Visit Vitals  /80 (BP 1 Location: Right arm, BP Patient Position: Sitting)   Pulse 66   Temp 97.4 °F (36.3 °C) (Oral)   Resp 18   Ht 5' 11\" (1.803 m)   Wt 211 lb (95.7 kg)   SpO2 96%   BMI 29.43 kg/m²     Past Medical History:   Diagnosis Date    Anemia     Hypertension     MRSA (methicillin resistant Staphylococcus aureus) 06/2018     Past Surgical History:   Procedure Laterality Date    IR DRAIN HEMATOMA SEROMA FLUID  06/2018     Social History     Socioeconomic History    Marital status: SINGLE     Spouse name: Not on file    Number of children: Not on file    Years of education: Not on file    Highest education level: Not on file   Occupational History    Not on file   Social Needs    Financial resource strain: Not on file    Food insecurity:     Worry: Not on file     Inability: Not on file    Transportation needs:     Medical: Not on file     Non-medical: Not on file   Tobacco Use    Smoking status: Never Smoker    Smokeless tobacco: Never Used   Substance and Sexual Activity    Alcohol use:  Yes     Alcohol/week: 1.0 standard drinks     Types: 1 Cans of beer per week    Drug use: No    Sexual activity: Yes     Partners: Female     Birth control/protection: Condom   Lifestyle    Physical activity:     Days per week: Not on file     Minutes per session: Not on file    Stress: Not on file   Relationships    Social connections:     Talks on phone: Not on file     Gets together: Not on file     Attends Quaker service: Not on file     Active member of club or organization: Not on file     Attends meetings of clubs or organizations: Not on file     Relationship status: Not on file    Intimate partner violence:     Fear of current or ex partner: Not on file     Emotionally abused: Not on file     Physically abused: Not on file     Forced sexual activity: Not on file   Other Topics Concern    Not on file   Social History Narrative    Not on file     Outpatient Encounter Medications as of 1/8/2020   Medication Sig Dispense Refill    fluticasone propion-salmeterol (ADVAIR DISKUS) 250-50 mcg/dose diskus inhaler USE 1 PUFF BY MOUTH EVERY 12 HOURS 60 Each 0    albuterol (PROVENTIL VENTOLIN) 2.5 mg /3 mL (0.083 %) nebu INHALE 1 VIAL VIA NEBULIZER THREE TIMES DAILY 180 Each 1    montelukast (SINGULAIR) 10 mg tablet TAKE 1 TAB BY MOUTH DAILY. 30 Tab 0    albuterol (PROVENTIL HFA, VENTOLIN HFA, PROAIR HFA) 90 mcg/actuation inhaler Take 1 Puff by inhalation every four (4) hours as needed for Wheezing. 1 Inhaler 1    [DISCONTINUED] montelukast (SINGULAIR) 10 mg tablet TAKE 1 TAB BY MOUTH DAILY. 30 Tab 0    [DISCONTINUED] albuterol (PROVENTIL VENTOLIN) 2.5 mg /3 mL (0.083 %) nebulizer solution INHALE 1 VIAL VIA NEBULIZER THREE TIMES DAILY 180 Each 1    [DISCONTINUED] ADVAIR DISKUS 250-50 mcg/dose diskus inhaler USE 1 PUFF BY MOUTH EVERY 12 HOURS 60 Each 0    [DISCONTINUED] albuterol (PROVENTIL HFA, VENTOLIN HFA, PROAIR HFA) 90 mcg/actuation inhaler Take 1 Puff by inhalation every four (4) hours as needed for Wheezing.  1 Inhaler 1    [DISCONTINUED] predniSONE (DELTASONE) 10 mg tablet 6 tabs daily for 2 days then  5 tabs daily for 2 days then    4 tabs daily for 2 days then    3 tabs daily for 1 days then   2 tabs daily for 1 days then   1 tab daily for 1 days 36 Tab 0     No facility-administered encounter medications on file as of 1/8/2020. HPI    Review of Systems   Constitutional: Negative for fever. HENT: Negative. Eyes: Negative. Respiratory: Positive for cough and shortness of breath. Snoring, and labored breathing when laying down    Gastrointestinal: Negative for heartburn and nausea. Genitourinary: Negative. Musculoskeletal: Negative. Neurological: Negative for dizziness and headaches. Psychiatric/Behavioral: Negative. Physical Exam  Vitals signs and nursing note reviewed. Constitutional:       Appearance: Normal appearance. Neck:      Musculoskeletal: Neck supple. Cardiovascular:      Rate and Rhythm: Normal rate and regular rhythm. Pulses: Normal pulses. Heart sounds: Normal heart sounds. Pulmonary:      Breath sounds: Normal breath sounds. No wheezing. Abdominal:      General: Bowel sounds are normal.      Palpations: Abdomen is soft. Musculoskeletal: Normal range of motion. Skin:     General: Skin is warm. Neurological:      Mental Status: He is alert and oriented to person, place, and time. ASSESSMENT and PLAN  Diagnoses and all orders for this visit:    1. Moderate persistent asthma with acute exacerbation  -     fluticasone propion-salmeterol (ADVAIR DISKUS) 250-50 mcg/dose diskus inhaler; USE 1 PUFF BY MOUTH EVERY 12 HOURS  -     albuterol (PROVENTIL VENTOLIN) 2.5 mg /3 mL (0.083 %) nebu; INHALE 1 VIAL VIA NEBULIZER THREE TIMES DAILY  -     montelukast (SINGULAIR) 10 mg tablet; TAKE 1 TAB BY MOUTH DAILY. -     albuterol (PROVENTIL HFA, VENTOLIN HFA, PROAIR HFA) 90 mcg/actuation inhaler; Take 1 Puff by inhalation every four (4) hours as needed for Wheezing.     2. Routine adult health maintenance  -     METABOLIC PANEL, COMPREHENSIVE  -     CBC WITH AUTOMATED DIFF  -     TSH 3RD GENERATION  -     LIPID PANEL    3. Sleep disorder breathing  -     SLEEP MEDICINE REFERRAL    4. Cough    5. Overweight (BMI 25.0-29. 9)    Other orders  -     CVD REPORT          Follow-up and Dispositions    · Return in about 1 year (around 1/8/2021), or if symptoms worsen or fail to improve.       lab results and schedule of future lab studies reviewed with patient  reviewed diet, exercise and weight control  reviewed medications and side effects in detail

## 2020-01-08 NOTE — PROGRESS NOTES
Health Maintenance Due   Topic Date Due    Pneumococcal 0-64 years (1 of 1 - PPSV23) 01/23/1988    DTaP/Tdap/Td series (1 - Tdap) 01/23/1993       Chief Complaint   Patient presents with    Medication Evaluation     Establish care with new provider    Medication Refill     Med eval and refill visit       1. Have you been to the ER, urgent care clinic since your last visit? Hospitalized since your last visit? No    2. Have you seen or consulted any other health care providers outside of the [address] [place] [address] since your last visit? Include any pap smears or colon screening. No    3) Do you have an Advance Directive on file? no    4) Are you interested in receiving information on Advance Directives? NO      Patient is accompanied by self I have received verbal consent from Isela Cartagena to discuss any/all medical information while they are present in the room.

## 2020-01-09 LAB
ALBUMIN SERPL-MCNC: 4.3 G/DL (ref 3.5–5.5)
ALBUMIN/GLOB SERPL: 1.5 {RATIO} (ref 1.2–2.2)
ALP SERPL-CCNC: 73 IU/L (ref 39–117)
ALT SERPL-CCNC: 26 IU/L (ref 0–44)
AST SERPL-CCNC: 21 IU/L (ref 0–40)
BASOPHILS # BLD AUTO: 0.1 X10E3/UL (ref 0–0.2)
BASOPHILS NFR BLD AUTO: 1 %
BILIRUB SERPL-MCNC: 0.4 MG/DL (ref 0–1.2)
BUN SERPL-MCNC: 13 MG/DL (ref 6–20)
BUN/CREAT SERPL: 18 (ref 9–20)
CALCIUM SERPL-MCNC: 9.6 MG/DL (ref 8.7–10.2)
CHLORIDE SERPL-SCNC: 105 MMOL/L (ref 96–106)
CHOLEST SERPL-MCNC: 197 MG/DL (ref 100–199)
CO2 SERPL-SCNC: 22 MMOL/L (ref 20–29)
CREAT SERPL-MCNC: 0.74 MG/DL (ref 0.76–1.27)
EOSINOPHIL # BLD AUTO: 0.6 X10E3/UL (ref 0–0.4)
EOSINOPHIL NFR BLD AUTO: 10 %
ERYTHROCYTE [DISTWIDTH] IN BLOOD BY AUTOMATED COUNT: 12.6 % (ref 11.6–15.4)
GLOBULIN SER CALC-MCNC: 2.8 G/DL (ref 1.5–4.5)
GLUCOSE SERPL-MCNC: 83 MG/DL (ref 65–99)
HCT VFR BLD AUTO: 44.1 % (ref 37.5–51)
HDLC SERPL-MCNC: 56 MG/DL
HGB BLD-MCNC: 15 G/DL (ref 13–17.7)
IMM GRANULOCYTES # BLD AUTO: 0 X10E3/UL (ref 0–0.1)
IMM GRANULOCYTES NFR BLD AUTO: 0 %
INTERPRETATION, 910389: NORMAL
LDLC SERPL CALC-MCNC: 124 MG/DL (ref 0–99)
LYMPHOCYTES # BLD AUTO: 2.3 X10E3/UL (ref 0.7–3.1)
LYMPHOCYTES NFR BLD AUTO: 38 %
MCH RBC QN AUTO: 29.9 PG (ref 26.6–33)
MCHC RBC AUTO-ENTMCNC: 34 G/DL (ref 31.5–35.7)
MCV RBC AUTO: 88 FL (ref 79–97)
MONOCYTES # BLD AUTO: 0.5 X10E3/UL (ref 0.1–0.9)
MONOCYTES NFR BLD AUTO: 9 %
NEUTROPHILS # BLD AUTO: 2.6 X10E3/UL (ref 1.4–7)
NEUTROPHILS NFR BLD AUTO: 42 %
PLATELET # BLD AUTO: 313 X10E3/UL (ref 150–450)
POTASSIUM SERPL-SCNC: 4.5 MMOL/L (ref 3.5–5.2)
PROT SERPL-MCNC: 7.1 G/DL (ref 6–8.5)
RBC # BLD AUTO: 5.02 X10E6/UL (ref 4.14–5.8)
SODIUM SERPL-SCNC: 142 MMOL/L (ref 134–144)
TRIGL SERPL-MCNC: 86 MG/DL (ref 0–149)
TSH SERPL DL<=0.005 MIU/L-ACNC: 1.93 UIU/ML (ref 0.45–4.5)
VLDLC SERPL CALC-MCNC: 17 MG/DL (ref 5–40)
WBC # BLD AUTO: 6 X10E3/UL (ref 3.4–10.8)

## 2020-01-09 NOTE — PROGRESS NOTES
Creatinine low, should increase water intake   LDL is high, monitor fats, fried foods and processed foods   All other labs stable

## 2020-01-28 ENCOUNTER — HOSPITAL ENCOUNTER (EMERGENCY)
Age: 40
Discharge: HOME | End: 2020-01-28
Attending: STUDENT IN AN ORGANIZED HEALTH CARE EDUCATION/TRAINING PROGRAM | Admitting: STUDENT IN AN ORGANIZED HEALTH CARE EDUCATION/TRAINING PROGRAM
Payer: COMMERCIAL

## 2020-01-28 ENCOUNTER — APPOINTMENT (OUTPATIENT)
Dept: CT IMAGING | Age: 40
End: 2020-01-28
Attending: PHYSICIAN ASSISTANT
Payer: COMMERCIAL

## 2020-01-28 VITALS
TEMPERATURE: 97.9 F | HEART RATE: 63 BPM | DIASTOLIC BLOOD PRESSURE: 94 MMHG | SYSTOLIC BLOOD PRESSURE: 162 MMHG | OXYGEN SATURATION: 99 % | RESPIRATION RATE: 18 BRPM
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
## 2020-01-28 DIAGNOSIS — M50.20 PROTRUSION OF CERVICAL INTERVERTEBRAL DISC: ICD-10-CM

## 2020-01-28 DIAGNOSIS — S09.90XA INJURY OF HEAD, INITIAL ENCOUNTER: ICD-10-CM

## 2020-01-28 LAB
ANION GAP SERPL CALC-SCNC: 5 MMOL/L (ref 5–15)
BASOPHILS # BLD: 0.1 K/UL (ref 0–0.1)
BASOPHILS NFR BLD: 1 % (ref 0–1)
BUN SERPL-MCNC: 15 MG/DL (ref 6–20)
BUN/CREAT SERPL: 16 (ref 12–20)
CALCIUM SERPL-MCNC: 9.1 MG/DL (ref 8.5–10.1)
CHLORIDE SERPL-SCNC: 111 MMOL/L (ref 97–108)
CO2 SERPL-SCNC: 25 MMOL/L (ref 21–32)
CREAT SERPL-MCNC: 0.96 MG/DL (ref 0.7–1.3)
DIFFERENTIAL METHOD BLD: NORMAL
EOSINOPHIL # BLD: 0.4 K/UL (ref 0–0.4)
EOSINOPHIL NFR BLD: 5 % (ref 0–7)
ERYTHROCYTE [DISTWIDTH] IN BLOOD BY AUTOMATED COUNT: 13.1 % (ref 11.5–14.5)
GLUCOSE SERPL-MCNC: 87 MG/DL (ref 65–100)
HCT VFR BLD AUTO: 42 % (ref 36.6–50.3)
HGB BLD-MCNC: 14.1 G/DL (ref 12.1–17)
IMM GRANULOCYTES # BLD AUTO: 0 K/UL (ref 0–0.04)
IMM GRANULOCYTES NFR BLD AUTO: 0 % (ref 0–0.5)
LYMPHOCYTES # BLD: 2.5 K/UL (ref 0.8–3.5)
LYMPHOCYTES NFR BLD: 31 % (ref 12–49)
MCH RBC QN AUTO: 29.9 PG (ref 26–34)
MCHC RBC AUTO-ENTMCNC: 33.6 G/DL (ref 30–36.5)
MCV RBC AUTO: 89.2 FL (ref 80–99)
MONOCYTES # BLD: 0.8 K/UL (ref 0–1)
MONOCYTES NFR BLD: 10 % (ref 5–13)
NEUTS SEG # BLD: 4.2 K/UL (ref 1.8–8)
NEUTS SEG NFR BLD: 53 % (ref 32–75)
NRBC # BLD: 0 K/UL (ref 0–0.01)
NRBC BLD-RTO: 0 PER 100 WBC
PLATELET # BLD AUTO: 203 K/UL (ref 150–400)
PMV BLD AUTO: 8.9 FL (ref 8.9–12.9)
POTASSIUM SERPL-SCNC: 4 MMOL/L (ref 3.5–5.1)
RBC # BLD AUTO: 4.71 M/UL (ref 4.1–5.7)
SODIUM SERPL-SCNC: 141 MMOL/L (ref 136–145)
WBC # BLD AUTO: 7.9 K/UL (ref 4.1–11.1)

## 2020-01-28 PROCEDURE — 70450 CT HEAD/BRAIN W/O DYE: CPT

## 2020-01-28 PROCEDURE — 71260 CT THORAX DX C+: CPT

## 2020-01-28 PROCEDURE — 85025 COMPLETE CBC W/AUTO DIFF WBC: CPT

## 2020-01-28 PROCEDURE — 99283 EMERGENCY DEPT VISIT LOW MDM: CPT

## 2020-01-28 PROCEDURE — 74177 CT ABD & PELVIS W/CONTRAST: CPT

## 2020-01-28 PROCEDURE — 74011000258 HC RX REV CODE- 258: Performed by: RADIOLOGY

## 2020-01-28 PROCEDURE — 36415 COLL VENOUS BLD VENIPUNCTURE: CPT

## 2020-01-28 PROCEDURE — 72125 CT NECK SPINE W/O DYE: CPT

## 2020-01-28 PROCEDURE — 74011636320 HC RX REV CODE- 636/320: Performed by: RADIOLOGY

## 2020-01-28 PROCEDURE — 80048 BASIC METABOLIC PNL TOTAL CA: CPT

## 2020-01-28 RX ORDER — SODIUM CHLORIDE 0.9 % (FLUSH) 0.9 %
10 SYRINGE (ML) INJECTION
Status: COMPLETED | OUTPATIENT
Start: 2020-01-28 | End: 2020-01-28

## 2020-01-28 RX ORDER — PREDNISONE 20 MG/1
60 TABLET ORAL DAILY
Qty: 15 TAB | Refills: 0 | Status: SHIPPED | OUTPATIENT
Start: 2020-01-28 | End: 2020-02-02

## 2020-01-28 RX ORDER — TRAMADOL HYDROCHLORIDE 50 MG/1
50 TABLET ORAL
Qty: 10 TAB | Refills: 0 | Status: SHIPPED | OUTPATIENT
Start: 2020-01-28 | End: 2020-01-31

## 2020-01-28 RX ORDER — METHOCARBAMOL 500 MG/1
500 TABLET, FILM COATED ORAL 4 TIMES DAILY
Qty: 30 TAB | Refills: 0 | Status: SHIPPED | OUTPATIENT
Start: 2020-01-28 | End: 2020-02-07

## 2020-01-28 RX ADMIN — IOPAMIDOL 100 ML: 755 INJECTION, SOLUTION INTRAVENOUS at 20:15

## 2020-01-28 RX ADMIN — IOHEXOL 50 ML: 240 INJECTION, SOLUTION INTRATHECAL; INTRAVASCULAR; INTRAVENOUS; ORAL at 19:14

## 2020-01-28 RX ADMIN — SODIUM CHLORIDE 100 ML: 900 INJECTION, SOLUTION INTRAVENOUS at 20:14

## 2020-01-28 RX ADMIN — Medication 10 ML: at 20:14
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
## 2020-01-29 NOTE — ED NOTES
2200: Patient verbalizes understanding of discharge instructions given by provider. Opportunity for questions provided. Patient in no apparent distress. Patient ambulatory upon discharge.
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
## 2020-02-03 DIAGNOSIS — J45.41 MODERATE PERSISTENT ASTHMA WITH ACUTE EXACERBATION: ICD-10-CM

## 2020-02-04 DIAGNOSIS — J45.41 MODERATE PERSISTENT ASTHMA WITH ACUTE EXACERBATION: ICD-10-CM

## 2020-02-04 RX ORDER — MONTELUKAST SODIUM 10 MG/1
TABLET ORAL
Qty: 30 TAB | Refills: 0 | Status: SHIPPED | OUTPATIENT
Start: 2020-02-04 | End: 2020-03-18

## 2020-02-04 RX ORDER — FLUTICASONE PROPIONATE AND SALMETEROL 250; 50 UG/1; UG/1
POWDER RESPIRATORY (INHALATION)
Qty: 1 INHALER | Refills: 0 | Status: SHIPPED | OUTPATIENT
Start: 2020-02-04 | End: 2020-03-18

## 2020-03-18 DIAGNOSIS — J45.41 MODERATE PERSISTENT ASTHMA WITH ACUTE EXACERBATION: ICD-10-CM

## 2020-03-18 RX ORDER — MONTELUKAST SODIUM 10 MG/1
TABLET ORAL
Qty: 30 TAB | Refills: 0 | Status: SHIPPED | OUTPATIENT
Start: 2020-03-18 | End: 2020-03-25 | Stop reason: SDUPTHER

## 2020-03-18 RX ORDER — FLUTICASONE PROPIONATE AND SALMETEROL 250; 50 UG/1; UG/1
POWDER RESPIRATORY (INHALATION)
Qty: 1 INHALER | Refills: 1 | Status: SHIPPED | OUTPATIENT
Start: 2020-03-18 | End: 2020-03-25 | Stop reason: SDUPTHER

## 2020-03-25 DIAGNOSIS — J45.41 MODERATE PERSISTENT ASTHMA WITH ACUTE EXACERBATION: ICD-10-CM

## 2020-03-26 RX ORDER — FLUTICASONE PROPIONATE AND SALMETEROL 250; 50 UG/1; UG/1
1 POWDER RESPIRATORY (INHALATION) 2 TIMES DAILY
Qty: 3 INHALER | Refills: 0 | Status: SHIPPED | OUTPATIENT
Start: 2020-03-26 | End: 2020-06-01

## 2020-03-26 RX ORDER — MONTELUKAST SODIUM 10 MG/1
10 TABLET ORAL DAILY
Qty: 90 TAB | Refills: 0 | Status: SHIPPED | OUTPATIENT
Start: 2020-03-26 | End: 2020-06-01

## 2020-09-15 DIAGNOSIS — J45.41 MODERATE PERSISTENT ASTHMA WITH ACUTE EXACERBATION: ICD-10-CM

## 2020-09-16 RX ORDER — FLUTICASONE PROPIONATE AND SALMETEROL 250; 50 UG/1; UG/1
POWDER RESPIRATORY (INHALATION)
Qty: 60 EACH | Refills: 11 | Status: SHIPPED | OUTPATIENT
Start: 2020-09-16 | End: 2021-09-03 | Stop reason: SDUPTHER

## 2020-09-16 RX ORDER — MONTELUKAST SODIUM 10 MG/1
TABLET ORAL
Qty: 90 TAB | Refills: 3 | Status: SHIPPED | OUTPATIENT
Start: 2020-09-16 | End: 2021-09-03 | Stop reason: SDUPTHER

## 2020-10-08 ENCOUNTER — OFFICE VISIT (OUTPATIENT)
Dept: INTERNAL MEDICINE CLINIC | Age: 40
End: 2020-10-08
Payer: COMMERCIAL

## 2020-10-08 VITALS
TEMPERATURE: 98.7 F | SYSTOLIC BLOOD PRESSURE: 144 MMHG | RESPIRATION RATE: 16 BRPM | HEIGHT: 71 IN | DIASTOLIC BLOOD PRESSURE: 94 MMHG | OXYGEN SATURATION: 98 % | WEIGHT: 194 LBS | HEART RATE: 60 BPM | BODY MASS INDEX: 27.16 KG/M2

## 2020-10-08 DIAGNOSIS — R21 SKIN ERUPTION: Primary | ICD-10-CM

## 2020-10-08 DIAGNOSIS — J45.41 MODERATE PERSISTENT ASTHMA WITH ACUTE EXACERBATION: ICD-10-CM

## 2020-10-08 DIAGNOSIS — E66.3 OVERWEIGHT (BMI 25.0-29.9): ICD-10-CM

## 2020-10-08 PROCEDURE — 99214 OFFICE O/P EST MOD 30 MIN: CPT | Performed by: INTERNAL MEDICINE

## 2020-10-08 RX ORDER — PREDNISONE 10 MG/1
10 TABLET ORAL SEE ADMIN INSTRUCTIONS
Qty: 21 TAB | Refills: 0 | Status: SHIPPED | OUTPATIENT
Start: 2020-10-08 | End: 2021-01-21 | Stop reason: ALTCHOICE

## 2020-10-08 NOTE — PROGRESS NOTES
Health Maintenance Due   Topic Date Due    Pneumococcal 0-64 years (1 of 1 - PPSV23) 01/23/1988    DTaP/Tdap/Td series (1 - Tdap) 01/23/2003       Chief Complaint   Patient presents with   Kentrell Cartagena     with fever, throat swelling, fatigue (for one night)       1. Have you been to the ER, urgent care clinic since your last visit? Hospitalized since your last visit? No    2. Have you seen or consulted any other health care providers outside of the [address] [place] [address] since your last visit? Include any pap smears or colon screening. No    3) Do you have an Advance Directive on file? yes    4) Are you interested in receiving information on Advance Directives? NO      Patient is accompanied by self I have received verbal consent from Sara Malone to discuss any/all medical information while they are present in the room.

## 2020-10-08 NOTE — PROGRESS NOTES
HISTORY OF PRESENT ILLNESS  Alessia Valdes is a 47 y.o. male. Patient with a history of asthma. Comes in after experiencing a rash that came about last week. Noticed it after waking up at night. He reports that he chest then became tight and that he was running a fever of 101. Reports that his throat felt narrow. States that he never had itchiness or trouble breathing at the time. States that she just sat down and waited for it subside. Reports that he took nothing. A few days later the rash reappeared. It was all over the torso, inner arms and upper legs. Patient reports that he has take no new medications in the last several months. No new food or products. Reports that he has albuterol, neb and inhaler and has did not take them at this time. Uticaria rash is still present today   No CP, SOB  Visit Vitals  BP (!) 144/94 (BP 1 Location: Right arm, BP Patient Position: Sitting)   Pulse 60   Temp 98.7 °F (37.1 °C)   Resp 16   Ht 5' 11\" (1.803 m)   Wt 194 lb (88 kg)   SpO2 98%   BMI 27.06 kg/m²     Past Medical History:   Diagnosis Date    Anemia     Hypertension     MRSA (methicillin resistant Staphylococcus aureus) 06/2018     Past Surgical History:   Procedure Laterality Date    IR DRAIN HEMATOMA SEROMA FLUID  06/2018     Family History   Problem Relation Age of Onset    Asthma Mother     Cancer Mother     Hypertension Mother     Asthma Sister      Outpatient Encounter Medications as of 10/8/2020   Medication Sig Dispense Refill    predniSONE (STERAPRED DS) 10 mg dose pack Take 1 Tab by mouth See Admin Instructions.  See administration instruction per 10mg dose pack 21 Tab 0    montelukast (SINGULAIR) 10 mg tablet TAKE 1 TABLET BY MOUTH  DAILY 90 Tab 3    Wixela Inhub 250-50 mcg/dose diskus inhaler USE 1 INHALATION BY MOUTH  TWICE DAILY 60 Each 11    albuterol (PROVENTIL VENTOLIN) 2.5 mg /3 mL (0.083 %) nebu INHALE 1 VIAL VIA NEBULIZER THREE TIMES DAILY 180 Each 1    albuterol (PROVENTIL HFA, VENTOLIN HFA, PROAIR HFA) 90 mcg/actuation inhaler Take 1 Puff by inhalation every four (4) hours as needed for Wheezing. 1 Inhaler 1     No facility-administered encounter medications on file as of 10/8/2020. HPI    Review of Systems   Constitutional: Negative for fever. Respiratory: Negative for cough. Cardiovascular: Negative for chest pain. Gastrointestinal: Negative. Musculoskeletal: Negative. Skin: Positive for rash. Negative for itching. Neurological: Negative for dizziness and headaches. Psychiatric/Behavioral: Negative. Physical Exam  Vitals signs and nursing note reviewed. Constitutional:       General: He is not in acute distress. HENT:      Head: Normocephalic and atraumatic. Neck:      Musculoskeletal: Neck supple. Cardiovascular:      Rate and Rhythm: Normal rate and regular rhythm. Pulmonary:      Effort: Pulmonary effort is normal.      Breath sounds: Normal breath sounds. Abdominal:      General: Bowel sounds are normal.      Palpations: Abdomen is soft. Skin:     General: Skin is warm. Findings: Rash present. Rash is urticarial.   Neurological:      Mental Status: He is alert and oriented to person, place, and time. Psychiatric:         Mood and Affect: Mood normal.         ASSESSMENT and PLAN  Diagnoses and all orders for this visit:    1. Skin eruption  -     predniSONE (STERAPRED DS) 10 mg dose pack; Take 1 Tab by mouth See Admin Instructions. See administration instruction per 10mg dose pack  -     REFERRAL TO ALLERGY    2. Overweight (BMI 25.0-29.9)    3.  Moderate persistent asthma with acute exacerbation        Patient educated on when to call 911      lab results and schedule of future lab studies reviewed with patient  reviewed medications and side effects in detail

## 2021-01-15 DIAGNOSIS — J45.41 MODERATE PERSISTENT ASTHMA WITH ACUTE EXACERBATION: ICD-10-CM

## 2021-01-15 RX ORDER — ALBUTEROL SULFATE 0.83 MG/ML
SOLUTION RESPIRATORY (INHALATION)
Qty: 150 ML | Refills: 1 | Status: SHIPPED | OUTPATIENT
Start: 2021-01-15 | End: 2021-09-03 | Stop reason: SDUPTHER

## 2021-01-15 RX ORDER — ALBUTEROL SULFATE 90 UG/1
AEROSOL, METERED RESPIRATORY (INHALATION)
Qty: 6.7 INHALER | Refills: 1 | Status: SHIPPED | OUTPATIENT
Start: 2021-01-15 | End: 2021-06-14 | Stop reason: SDUPTHER

## 2021-01-19 ENCOUNTER — APPOINTMENT (OUTPATIENT)
Dept: CT IMAGING | Age: 41
End: 2021-01-19
Attending: EMERGENCY MEDICINE
Payer: COMMERCIAL

## 2021-01-19 ENCOUNTER — APPOINTMENT (OUTPATIENT)
Dept: GENERAL RADIOLOGY | Age: 41
End: 2021-01-19
Attending: EMERGENCY MEDICINE
Payer: COMMERCIAL

## 2021-01-19 ENCOUNTER — HOSPITAL ENCOUNTER (EMERGENCY)
Age: 41
Discharge: HOME | End: 2021-01-19
Attending: EMERGENCY MEDICINE
Payer: COMMERCIAL

## 2021-01-19 VITALS
RESPIRATION RATE: 17 BRPM | OXYGEN SATURATION: 97 % | HEART RATE: 73 BPM | TEMPERATURE: 97.8 F | BODY MASS INDEX: 29.18 KG/M2 | SYSTOLIC BLOOD PRESSURE: 138 MMHG | DIASTOLIC BLOOD PRESSURE: 90 MMHG | WEIGHT: 209.22 LBS

## 2021-01-19 DIAGNOSIS — J18.9 PNEUMONIA OF LEFT LOWER LOBE DUE TO INFECTIOUS ORGANISM: ICD-10-CM

## 2021-01-19 DIAGNOSIS — R07.9 CHEST PAIN, UNSPECIFIED TYPE: Primary | ICD-10-CM

## 2021-01-19 LAB
ALBUMIN SERPL-MCNC: 3.8 G/DL (ref 3.5–5)
ALBUMIN/GLOB SERPL: 1.1 {RATIO} (ref 1.1–2.2)
ALP SERPL-CCNC: 76 U/L (ref 45–117)
ALT SERPL-CCNC: 36 U/L (ref 12–78)
ANION GAP SERPL CALC-SCNC: 4 MMOL/L (ref 5–15)
AST SERPL-CCNC: 25 U/L (ref 15–37)
ATRIAL RATE: 71 BPM
BASOPHILS # BLD: 0.1 K/UL (ref 0–0.1)
BASOPHILS NFR BLD: 1 % (ref 0–1)
BILIRUB SERPL-MCNC: 0.2 MG/DL (ref 0.2–1)
BUN SERPL-MCNC: 14 MG/DL (ref 6–20)
BUN/CREAT SERPL: 16 (ref 12–20)
CALCIUM SERPL-MCNC: 8.8 MG/DL (ref 8.5–10.1)
CALCULATED P AXIS, ECG09: 60 DEGREES
CALCULATED R AXIS, ECG10: 68 DEGREES
CALCULATED T AXIS, ECG11: 40 DEGREES
CHLORIDE SERPL-SCNC: 109 MMOL/L (ref 97–108)
CO2 SERPL-SCNC: 25 MMOL/L (ref 21–32)
COMMENT, HOLDF: NORMAL
CREAT SERPL-MCNC: 0.89 MG/DL (ref 0.7–1.3)
D DIMER PPP FEU-MCNC: 0.35 MG/L FEU (ref 0–0.65)
DIAGNOSIS, 93000: NORMAL
DIFFERENTIAL METHOD BLD: ABNORMAL
EOSINOPHIL # BLD: 0.5 K/UL (ref 0–0.4)
EOSINOPHIL NFR BLD: 4 % (ref 0–7)
ERYTHROCYTE [DISTWIDTH] IN BLOOD BY AUTOMATED COUNT: 13 % (ref 11.5–14.5)
GLOBULIN SER CALC-MCNC: 3.5 G/DL (ref 2–4)
GLUCOSE SERPL-MCNC: 110 MG/DL (ref 65–100)
HCT VFR BLD AUTO: 40.3 % (ref 36.6–50.3)
HGB BLD-MCNC: 13.9 G/DL (ref 12.1–17)
IMM GRANULOCYTES # BLD AUTO: 0 K/UL (ref 0–0.04)
IMM GRANULOCYTES NFR BLD AUTO: 0 % (ref 0–0.5)
LIPASE SERPL-CCNC: 201 U/L (ref 73–393)
LYMPHOCYTES # BLD: 2.1 K/UL (ref 0.8–3.5)
LYMPHOCYTES NFR BLD: 20 % (ref 12–49)
MCH RBC QN AUTO: 29.4 PG (ref 26–34)
MCHC RBC AUTO-ENTMCNC: 34.5 G/DL (ref 30–36.5)
MCV RBC AUTO: 85.2 FL (ref 80–99)
MONOCYTES # BLD: 1.1 K/UL (ref 0–1)
MONOCYTES NFR BLD: 11 % (ref 5–13)
NEUTS SEG # BLD: 6.9 K/UL (ref 1.8–8)
NEUTS SEG NFR BLD: 64 % (ref 32–75)
NRBC # BLD: 0 K/UL (ref 0–0.01)
NRBC BLD-RTO: 0 PER 100 WBC
P-R INTERVAL, ECG05: 182 MS
PLATELET # BLD AUTO: 252 K/UL (ref 150–400)
PMV BLD AUTO: 8.2 FL (ref 8.9–12.9)
POTASSIUM SERPL-SCNC: 3.6 MMOL/L (ref 3.5–5.1)
PROT SERPL-MCNC: 7.3 G/DL (ref 6.4–8.2)
Q-T INTERVAL, ECG07: 386 MS
QRS DURATION, ECG06: 88 MS
QTC CALCULATION (BEZET), ECG08: 419 MS
RBC # BLD AUTO: 4.73 M/UL (ref 4.1–5.7)
SAMPLES BEING HELD,HOLD: NORMAL
SODIUM SERPL-SCNC: 138 MMOL/L (ref 136–145)
TROPONIN I SERPL-MCNC: <0.05 NG/ML
VENTRICULAR RATE, ECG03: 71 BPM
WBC # BLD AUTO: 10.7 K/UL (ref 4.1–11.1)

## 2021-01-19 PROCEDURE — 74011250636 HC RX REV CODE- 250/636: Performed by: EMERGENCY MEDICINE

## 2021-01-19 PROCEDURE — 74011000250 HC RX REV CODE- 250: Performed by: EMERGENCY MEDICINE

## 2021-01-19 PROCEDURE — C9113 INJ PANTOPRAZOLE SODIUM, VIA: HCPCS | Performed by: EMERGENCY MEDICINE

## 2021-01-19 PROCEDURE — 96374 THER/PROPH/DIAG INJ IV PUSH: CPT

## 2021-01-19 PROCEDURE — 80053 COMPREHEN METABOLIC PANEL: CPT

## 2021-01-19 PROCEDURE — 99285 EMERGENCY DEPT VISIT HI MDM: CPT

## 2021-01-19 PROCEDURE — 71045 X-RAY EXAM CHEST 1 VIEW: CPT

## 2021-01-19 PROCEDURE — 74011250637 HC RX REV CODE- 250/637: Performed by: EMERGENCY MEDICINE

## 2021-01-19 PROCEDURE — 36415 COLL VENOUS BLD VENIPUNCTURE: CPT

## 2021-01-19 PROCEDURE — 84484 ASSAY OF TROPONIN QUANT: CPT

## 2021-01-19 PROCEDURE — 74011000636 HC RX REV CODE- 636: Performed by: RADIOLOGY

## 2021-01-19 PROCEDURE — 83690 ASSAY OF LIPASE: CPT

## 2021-01-19 PROCEDURE — 93005 ELECTROCARDIOGRAM TRACING: CPT

## 2021-01-19 PROCEDURE — 85379 FIBRIN DEGRADATION QUANT: CPT

## 2021-01-19 PROCEDURE — 85025 COMPLETE CBC W/AUTO DIFF WBC: CPT

## 2021-01-19 PROCEDURE — 96375 TX/PRO/DX INJ NEW DRUG ADDON: CPT

## 2021-01-19 PROCEDURE — 71275 CT ANGIOGRAPHY CHEST: CPT

## 2021-01-19 RX ORDER — AZITHROMYCIN 250 MG/1
250 TABLET, FILM COATED ORAL DAILY
Qty: 4 TAB | Refills: 0 | Status: SHIPPED | OUTPATIENT
Start: 2021-01-19 | End: 2021-01-23

## 2021-01-19 RX ORDER — KETOROLAC TROMETHAMINE 30 MG/ML
30 INJECTION, SOLUTION INTRAMUSCULAR; INTRAVENOUS
Status: COMPLETED | OUTPATIENT
Start: 2021-01-19 | End: 2021-01-19

## 2021-01-19 RX ORDER — AZITHROMYCIN 250 MG/1
500 TABLET, FILM COATED ORAL
Status: COMPLETED | OUTPATIENT
Start: 2021-01-19 | End: 2021-01-19

## 2021-01-19 RX ADMIN — LIDOCAINE HYDROCHLORIDE 40 ML: 20 SOLUTION ORAL; TOPICAL at 01:24

## 2021-01-19 RX ADMIN — PANTOPRAZOLE SODIUM 40 MG: 40 INJECTION, POWDER, FOR SOLUTION INTRAVENOUS at 01:26

## 2021-01-19 RX ADMIN — AZITHROMYCIN MONOHYDRATE 500 MG: 250 TABLET ORAL at 04:07

## 2021-01-19 RX ADMIN — KETOROLAC TROMETHAMINE 30 MG: 30 INJECTION, SOLUTION INTRAMUSCULAR at 02:14

## 2021-01-19 RX ADMIN — IOPAMIDOL 80 ML: 755 INJECTION, SOLUTION INTRAVENOUS at 03:18

## 2021-01-19 NOTE — ED TRIAGE NOTES
Pt with extreme pain in center of chest which presented immediately after exertion. Pt states 2 episodes this past week as well which were not related to activity.

## 2021-01-19 NOTE — ED PROVIDER NOTES
HPI     51-year-old male with a history of asthma and the emergency department with chest pain. Patient states he has had 3 episodes in the past week. Tonight is the worst episode. He states it is a sharp pain and tightness. The pain the first 2 times came on at rest and lasted several hours. Tonight he was at the gym workout normally and after working out developed 8 out of 10 pain that radiates through to his back. He does report some shortness of breath nausea and breaking out into a cold sweat. He states he went home and took a baby aspirin around 830. He tried to lay down to go to sleep but the pain will not go away. He states it is worse with deep breathing. It is worse when he lays back. He states that sometimes it feels like his heart is beating irregularly. Patient denies a history of hypertension, high cholesterol, diabetes, or smoking. He states his maternal grandparents had heart disease in her 76s. He has never had pain like this before. He denies any calf pain or swelling or any recent travel. No history of DVT or PE. He does report occasional alcohol use but no drugs of abuse. Patient states onset of symptoms was 730 this evening. He denies any fever cough sore throat change in taste or smell or any known COVID-19 symptoms or exposure.     Past Medical History:   Diagnosis Date    Anemia     Hypertension     MRSA (methicillin resistant Staphylococcus aureus) 06/2018       Past Surgical History:   Procedure Laterality Date    IR DRAIN HEMATOMA SEROMA FLUID  06/2018         Family History:   Problem Relation Age of Onset    Asthma Mother     Cancer Mother     Hypertension Mother     Asthma Sister        Social History     Socioeconomic History    Marital status: SINGLE     Spouse name: Not on file    Number of children: Not on file    Years of education: Not on file    Highest education level: Not on file   Occupational History    Not on file   Social Needs    Financial resource strain: Not on file    Food insecurity     Worry: Not on file     Inability: Not on file    Transportation needs     Medical: Not on file     Non-medical: Not on file   Tobacco Use    Smoking status: Never Smoker    Smokeless tobacco: Never Used   Substance and Sexual Activity    Alcohol use: Yes     Alcohol/week: 1.0 standard drinks     Types: 1 Cans of beer per week    Drug use: No    Sexual activity: Yes     Partners: Female     Birth control/protection: Condom   Lifestyle    Physical activity     Days per week: Not on file     Minutes per session: Not on file    Stress: Not on file   Relationships    Social connections     Talks on phone: Not on file     Gets together: Not on file     Attends Nondenominational service: Not on file     Active member of club or organization: Not on file     Attends meetings of clubs or organizations: Not on file     Relationship status: Not on file    Intimate partner violence     Fear of current or ex partner: Not on file     Emotionally abused: Not on file     Physically abused: Not on file     Forced sexual activity: Not on file   Other Topics Concern    Not on file   Social History Narrative    Not on file         ALLERGIES: Patient has no known allergies. Review of Systems   Constitutional: Positive for fever. HENT: Negative for sore throat. Eyes: Negative for visual disturbance. Respiratory: Positive for chest tightness and shortness of breath. Cardiovascular: Positive for chest pain and palpitations. Gastrointestinal: Positive for nausea. Negative for abdominal pain and vomiting. Genitourinary: Negative for dysuria. Musculoskeletal: Negative for gait problem. Skin: Negative for rash. Neurological: Negative for headaches. Psychiatric/Behavioral: Negative for dysphoric mood.        Vitals:    01/19/21 0019   BP: (!) (P) 158/90   Pulse: (P) 81   Resp: (P) 18   Temp: (P) 97.8 °F (36.6 °C)   SpO2: (P) 98%            Physical Exam  Constitutional:       General: He is not in acute distress. Appearance: He is well-developed. HENT:      Head: Normocephalic and atraumatic. Mouth/Throat:      Pharynx: No oropharyngeal exudate. Eyes:      General: No scleral icterus. Right eye: No discharge. Left eye: No discharge. Pupils: Pupils are equal, round, and reactive to light. Neck:      Musculoskeletal: Normal range of motion and neck supple. Vascular: No JVD. Cardiovascular:      Rate and Rhythm: Normal rate and regular rhythm. Heart sounds: Normal heart sounds. No murmur. Pulmonary:      Effort: Pulmonary effort is normal. No respiratory distress. Breath sounds: Normal breath sounds. No stridor. No wheezing or rales. Chest:      Chest wall: No tenderness. Abdominal:      General: Bowel sounds are normal. There is no distension. Palpations: Abdomen is soft. There is no mass. Tenderness: There is abdominal tenderness (epigastrium). There is no guarding or rebound. Musculoskeletal: Normal range of motion. Skin:     General: Skin is warm and dry. Capillary Refill: Capillary refill takes less than 2 seconds. Findings: No rash. Neurological:      Mental Status: He is oriented to person, place, and time. Psychiatric:         Behavior: Behavior normal.         Thought Content: Thought content normal.         Judgment: Judgment normal.          MDM       Procedures      ED EKG interpretation:  Rhythm: normal sinus rhythm; and regular . Rate (approx.): 71; Axis: normal; P wave: normal; QRS interval: normal ; ST/T wave: non-specific changes;. This EKG was interpreted by Ben Mejia MD,ED Provider. Troponin neg after 5 hours of constant pain. CXR clear. Lipase wnl. No relief with gi cocktail, Protonix or tramadol. Will CT chest. Patient drove here.      CT scan shows small area in left lower lobe: A small area of peripheral bronchial mucous plugging with minimal patchy  opacity in the posterolateral left lower lobe suggestive of nonspecific  infection or inflammation. Will treat with antibiotics. Encouraged close follow up with primary care and repeat CT in 8-10 weeks as recommended.

## 2021-01-19 NOTE — DISCHARGE INSTRUCTIONS
Work up in the emergency room was reassuring tonight except the cat scan shows a possible pneumonia in the left lower lung. The radiologist recommending a repeat cat scan in 8-10 weeks to be sure it's resolving. If your symptoms worsen- increased pain, shortness of breath, fever, vomiting, pass out, etc. Return here.

## 2021-01-20 ENCOUNTER — PATIENT OUTREACH (OUTPATIENT)
Dept: CASE MANAGEMENT | Age: 41
End: 2021-01-20

## 2021-01-20 NOTE — PROGRESS NOTES
Patient contacted regarding Drew Cousin. Did not discuss COVID-19 related testing which was not done at this time. Ambulatory Care Manager contacted the patient by telephone to perform post discharge assessment. Call within 2 business days of discharge: Yes Verified name and  with patient as identifiers. Provided introduction to self, and explanation of the ACM role, and reason for call due to risk factors for infection and/or exposure to COVID-19. Symptoms reviewed with patient who verbalized the following symptoms: no new symptoms and no worsening symptoms      Due to no new or worsening symptoms encounter was not routed to provider for escalation. Discussed follow-up appointments. If no appointment was previously scheduled, appointment scheduling offered:  No, patient states he plans to call PCP office to set up appointment today   [facility] follow up appointment(s): No future appointments. Non-[facility] follow up appointment(s): n/a     Advance Care Planning:   Does patient have an Advance Directive:  not on file; education provided. Patient verified healthcare decision makers as correctly listed in chart: Elizabeth Hercules (brother) [phone]       Patient has following risk factors of: asthma and ED visit 21. ACM reviewed discharge instructions, medical action plan and red flags such as increased shortness of breath, increasing fever and signs of decompensation with patient who verbalized understanding. Discussed exposure protocols and quarantine with CDC Guidelines What to do if you are sick with coronavirus disease .  Patient was given an opportunity for questions and concerns. The patient agrees to contact the [facility] exposure line [phone], local [facility] department [facility]  ([phone] and PCP office for questions related to their healthcare. ACM provided contact information for future needs.     Reviewed and educated patient on any new and changed medications related to discharge diagnosis     Patient/family/caregiver given information for GetWell Loop and agrees to enroll yes  Patient's preferred e-mail: [email or web address]   Patient's preferred phone number: [phone]  Based on Loop alert triggers, patient will be contacted by nurse care manager for worsening symptoms. Pt will be further monitored by COVID Loop Team based on severity of symptoms and risk factors.   Arnold Werner RN  Ambulatory Care Manager

## 2021-01-21 ENCOUNTER — VIRTUAL VISIT (OUTPATIENT)
Dept: INTERNAL MEDICINE CLINIC | Age: 41
End: 2021-01-21
Payer: COMMERCIAL

## 2021-01-21 DIAGNOSIS — E66.3 OVERWEIGHT (BMI 25.0-29.9): ICD-10-CM

## 2021-01-21 DIAGNOSIS — M94.0 COSTOCHONDRITIS: ICD-10-CM

## 2021-01-21 DIAGNOSIS — J45.41 MODERATE PERSISTENT ASTHMA WITH ACUTE EXACERBATION: Primary | ICD-10-CM

## 2021-01-21 DIAGNOSIS — R21 SKIN ERUPTION: ICD-10-CM

## 2021-01-21 DIAGNOSIS — R91.8 OPACITY OF LUNG ON IMAGING STUDY: ICD-10-CM

## 2021-01-21 PROCEDURE — 99214 OFFICE O/P EST MOD 30 MIN: CPT | Performed by: INTERNAL MEDICINE

## 2021-01-21 RX ORDER — FLUTICASONE PROPIONATE 50 MCG
SPRAY, SUSPENSION (ML) NASAL
COMMUNITY
Start: 2020-12-17

## 2021-01-21 RX ORDER — PREDNISONE 10 MG/1
10 TABLET ORAL SEE ADMIN INSTRUCTIONS
Qty: 21 TAB | Refills: 0 | Status: SHIPPED | OUTPATIENT
Start: 2021-01-21 | End: 2021-09-03 | Stop reason: ALTCHOICE

## 2021-01-21 NOTE — PROGRESS NOTES
HIPAA verified by two patient identifiers. Kirt Amos is a 47 y.o. male  Patient is aware this is a billable V V  appointment  depending on the insurance. Preferred number: [phone]  Link sent to above #. Reviewed address. Chief Complaint   Patient presents with    ED Follow-up     [facility] for pneumonia 1/19/2021   still having chest pain and heart fluttering        There were no vitals taken for this visit. Pain Scale: /10  Pain Location:     Health Maintenance Due   Topic Date Due    Pneumococcal 0-64 years (1 of 1 - PPSV23) 01/23/1988    DTaP/Tdap/Td series (1 - Tdap) 01/23/2003       Coordination of Care Questionnaire:  :   1) Have you been to an emergency room, urgent care, or hospitalized since your last visit? If yes, where when, and reason for visit? yes   [facility]   1/19/2021    Pneumonia     2. Have seen or consulted any other health care provider since your last visit? If yes, where when, and reason for visit? NO     I have received verbal consent from Kirt Amos to discuss any/all medical information while they are on the phone.

## 2021-01-21 NOTE — PROGRESS NOTES
Tesfaye Hernandez is a 47 y.o. male who was seen by synchronous (real-time) audio-video technology on 1/21/2021 for ED Follow-up ([facility] for pneumonia 1/19/2021   still having chest pain and heart fluttering )        Assessment & Plan:   Diagnoses and all orders for this visit:    1. Moderate persistent asthma with acute exacerbation  -     predniSONE (STERAPRED DS) 10 mg dose pack; Take 1 Tab by mouth See Admin Instructions. See administration instruction per 10mg dose pack    2. Skin eruption    3. Overweight (BMI 25.0-29.9)    4. Costochondritis    5. Opacity of lung on imaging study  -     predniSONE (STERAPRED DS) 10 mg dose pack; Take 1 Tab by mouth See Admin Instructions. See administration instruction per 10mg dose pack        Patient educated about costochondritis symptoms and management including decreasing physical strain( weight lifting), use of NSAIDS, alternation of heat and cold. Patient instructed to return to ER if chest pain is severe or further complaints develop  Patient given prescription for prednisone to help reduce swelling and aid in pain relief  Patient to schedule dermatology appt for rash that continues or becomes worse. Pt to finish Z pac which was given to him in ER on Monday  Pt to f/u with our practice if symptoms become worsened or he has concerns    Subjective:     Tesfaye Hernandez is a 47 y.o male with PMH of asthma. He is seen today for follow up after ER visit on Monday January 18th. Pt reports he started having chest pain during his workout with slight SOB and heart palpitations. Once he sat for a few minutes it resolved. Later that evening he said he woke up with chest pain that he described as more intense, palpitations and dizziness with increased respirations. Pt went to the ED and had a negative cardiac workup. Chest CT done in ER showed  Small area of mucous plugging in left lower lung and he was placed him on a Zpac.    Pt reports feeling better but last night he did note a racing heart with noted pain to lower sternal area and back which subsided rather quickly. Pt points to area of lower sternum and left lowest rib as place of noted pain. Pt also notes a rash to bilateral axilla area. It is not itchy or with any exudate. He states it looks like a rash he has had in the past for which he was placed on prednisone which did not help. He has not used any new detergents, lotions or changes to diet. Pt currently with no SOB, chest pain , n/v/d, or dizziness. Prior to Admission medications    Medication Sig Start Date End Date Taking? Authorizing Provider   fluticasone propionate (FLONASE) 50 mcg/actuation nasal spray USE 1 SPRAY IN EACH NOSTRIL TWICE DAILY 12/17/20  Yes Provider, Historical   predniSONE (STERAPRED DS) 10 mg dose pack Take 1 Tab by mouth See Admin Instructions. See administration instruction per 10mg dose pack 1/21/21  Yes Desean Suarez NP   azithromycin (Zithromax Z-Vasile) 250 mg tablet Take 1 Tab by mouth daily for 4 days.  1/19/21 1/23/21 Yes Garret Maher MD   albuterol (PROVENTIL HFA, VENTOLIN HFA, PROAIR HFA) 90 mcg/actuation inhaler TAKE 1 PUFF BY MOUTH EVERY 4 HOURS AS NEEDED FOR WHEEZING 1/15/21  Yes Omar Sanchez NP   albuterol (PROVENTIL VENTOLIN) 2.5 mg /3 mL (0.083 %) nebu INHALE 1 VIAL VIA NEBULIZER THREE TIMES DAILY 1/15/21  Yes Omar Sanchez NP   montelukast (SINGULAIR) 10 mg tablet TAKE 1 TABLET BY MOUTH  DAILY 9/16/20  Yes Willi Leos MD   Wixela Inhub 250-50 mcg/dose diskus inhaler USE 1 INHALATION BY MOUTH  TWICE DAILY 9/16/20  Yes Willi Leos MD     Patient Active Problem List   Diagnosis Code    Asthma J45.909     Patient Active Problem List    Diagnosis Date Noted    Asthma 03/11/2018     Current Outpatient Medications   Medication Sig Dispense Refill    fluticasone propionate (FLONASE) 50 mcg/actuation nasal spray USE 1 SPRAY IN EACH NOSTRIL TWICE DAILY      predniSONE (STERAPRED DS) 10 mg dose pack Take 1 Tab by mouth See Admin Instructions. See administration instruction per 10mg dose pack 21 Tab 0    azithromycin (Zithromax Z-Vasile) 250 mg tablet Take 1 Tab by mouth daily for 4 days. 4 Tab 0    albuterol (PROVENTIL HFA, VENTOLIN HFA, PROAIR HFA) 90 mcg/actuation inhaler TAKE 1 PUFF BY MOUTH EVERY 4 HOURS AS NEEDED FOR WHEEZING 6.7 Inhaler 1    albuterol (PROVENTIL VENTOLIN) 2.5 mg /3 mL (0.083 %) nebu INHALE 1 VIAL VIA NEBULIZER THREE TIMES DAILY 150 mL 1    montelukast (SINGULAIR) 10 mg tablet TAKE 1 TABLET BY MOUTH  DAILY 90 Tab 3    Wixela Inhub 250-50 mcg/dose diskus inhaler USE 1 INHALATION BY MOUTH  TWICE DAILY 60 Each 11     No Known Allergies  Past Medical History:   Diagnosis Date    Anemia     Hypertension     MRSA (methicillin resistant Staphylococcus aureus) 06/2018     Past Surgical History:   Procedure Laterality Date    IR DRAIN HEMATOMA SEROMA FLUID  06/2018     Family History   Problem Relation Age of Onset    Asthma Mother     Cancer Mother     Hypertension Mother     Asthma Sister      Social History     Tobacco Use    Smoking status: Never Smoker    Smokeless tobacco: Never Used   Substance Use Topics    Alcohol use: Yes     Alcohol/week: 1.0 standard drinks     Types: 1 Cans of beer per week       Review of Systems   Constitutional: Negative. HENT: Negative. Eyes: Negative. Respiratory: Negative. Cardiovascular: Positive for chest pain and palpitations. Pt not currently with symptoms but had within last 24 hours   Gastrointestinal: Negative. Genitourinary: Negative. Musculoskeletal: Positive for myalgias. Skin: Positive for rash. Neurological: Negative. Endo/Heme/Allergies: Negative.         Objective:     Patient-Reported Vitals 1/21/2021   Patient-Reported Pulse 70        [INSTRUCTIONS:  \"[x]\" Indicates a positive item  \"[]\" Indicates a negative item  -- DELETE ALL ITEMS NOT EXAMINED]    Constitutional: [x] Appears well-developed and well-nourished [x] No apparent distress      [] Abnormal -     Mental status: [x] Alert and awake  [x] Oriented to person/place/time [x] Able to follow commands    [] Abnormal -     Eyes:   EOM    [x]  Normal    [] Abnormal -   Sclera  [x]  Normal    [] Abnormal -          Discharge [x]  None visible   [] Abnormal -     HENT: [x] Normocephalic, atraumatic  [] Abnormal -   [x] Mouth/Throat: Mucous membranes are moist    External Ears [x] Normal  [] Abnormal -    Neck: [x] No visualized mass [] Abnormal -     Pulmonary/Chest: [x] Respiratory effort normal   [x] No visualized signs of difficulty breathing or respiratory distress        [] Abnormal -      Musculoskeletal:   [] Normal gait with no signs of ataxia         [x] Normal range of motion of neck        [] Abnormal -     Neurological:        [x] No Facial Asymmetry (Cranial nerve 7 motor function) (limited exam due to video visit)          [x] No gaze palsy        [] Abnormal -          Skin:        [] No significant exanthematous lesions or discoloration noted on facial skin         [x] Abnormal - patient with raised, pink rash to bilateral distal axilla area         Psychiatric:       [x] Normal Affect [] Abnormal -        [x] No Hallucinations    Other pertinent observable physical exam findings:-        We discussed the expected course, resolution and complications of the diagnosis(es) in detail. Medication risks, benefits, costs, interactions, and alternatives were discussed as indicated. I advised him to contact the office if his condition worsens, changes or fails to improve as anticipated. He expressed understanding with the diagnosis(es) and plan. Ann Loreto, who was evaluated through a patient-initiated, synchronous (real-time) audio-video encounter, and/or his healthcare decision maker, is aware that it is a billable service, with coverage as determined by his insurance carrier.  He provided verbal consent to proceed: Yes, and patient identification was verified. It was conducted pursuant to the emergency declaration under the [facility], [address] [place] [address] authority and the [employer] General Act. A caregiver was present when appropriate. Ability to conduct physical exam was limited. I was at home. The patient was at home. Ginny Pozo -NP student  Nadia Abel NP                                                                                                                Consent For Provider Observation  [facility] (\"[facility]\") has agreed to permit a provider employed by [facility] (\"Observer\") to observe care to patients treated in its physician practices. Your physician has agreed to permit such Observer to observe his/her patient care activities. Such observation will not include any direct participation in the care provided to you. This writer has obtained verbal permission from this patient to permit Observer to observe their medical care received at [facility]. This patient agrees that they have been given the opportunity to refuse to give such consent and that they may withdraw their consent at any time, except to the extent [facility] has relied on this consent and an Observer has already observed their medical care. This consent shall remain in effect for 1 year, unless otherwise withdrawn by this patient.

## 2021-06-14 DIAGNOSIS — J45.41 MODERATE PERSISTENT ASTHMA WITH ACUTE EXACERBATION: ICD-10-CM

## 2021-06-14 RX ORDER — ALBUTEROL SULFATE 90 UG/1
AEROSOL, METERED RESPIRATORY (INHALATION)
Qty: 6.7 INHALER | Refills: 1 | Status: SHIPPED | OUTPATIENT
Start: 2021-06-14 | End: 2021-09-03 | Stop reason: SDUPTHER

## 2021-09-03 ENCOUNTER — VIRTUAL VISIT (OUTPATIENT)
Dept: INTERNAL MEDICINE CLINIC | Age: 41
End: 2021-09-03
Payer: COMMERCIAL

## 2021-09-03 DIAGNOSIS — J45.41 MODERATE PERSISTENT ASTHMA WITH ACUTE EXACERBATION: Primary | ICD-10-CM

## 2021-09-03 DIAGNOSIS — F41.8 SITUATIONAL ANXIETY: ICD-10-CM

## 2021-09-03 PROCEDURE — 99213 OFFICE O/P EST LOW 20 MIN: CPT | Performed by: INTERNAL MEDICINE

## 2021-09-03 RX ORDER — FLUTICASONE PROPIONATE AND SALMETEROL 250; 50 UG/1; UG/1
POWDER RESPIRATORY (INHALATION)
Qty: 60 EACH | Refills: 11 | Status: SHIPPED | OUTPATIENT
Start: 2021-09-03 | End: 2022-10-07

## 2021-09-03 RX ORDER — MONTELUKAST SODIUM 10 MG/1
10 TABLET ORAL DAILY
Qty: 90 TABLET | Refills: 3 | Status: SHIPPED | OUTPATIENT
Start: 2021-09-03 | End: 2022-07-30

## 2021-09-03 RX ORDER — ALBUTEROL SULFATE 0.83 MG/ML
SOLUTION RESPIRATORY (INHALATION)
Qty: 150 ML | Refills: 1 | Status: SHIPPED | OUTPATIENT
Start: 2021-09-03 | End: 2021-09-07 | Stop reason: SDUPTHER

## 2021-09-03 RX ORDER — ALBUTEROL SULFATE 90 UG/1
AEROSOL, METERED RESPIRATORY (INHALATION)
Qty: 6.7 G | Refills: 2 | Status: SHIPPED | OUTPATIENT
Start: 2021-09-03

## 2021-09-03 NOTE — PROGRESS NOTES
Pravin Rodriguez is a 46 y.o. male who was seen by synchronous (real-time) audio-video technology on 9/3/2021 for Medication Refill (albuterol (PROVENTIL VENTOLIN) 2.5 mg /3 mL (0.083 %) nebulizer solution refill needs to go to [facility] on file)        Assessment & Plan:   Diagnoses and all orders for this visit:    1. Moderate persistent asthma with acute exacerbation  -     montelukast (SINGULAIR) 10 mg tablet; Take 1 Tablet by mouth daily. -     fluticasone propion-salmeteroL (Wixela Inhub) 250-50 mcg/dose diskus inhaler; USE 1 INHALATION BY MOUTH  TWICE DAILY  -     albuterol (PROVENTIL VENTOLIN) 2.5 mg /3 mL (0.083 %) nebu; Take as needed every 4 hours as needed  -     albuterol (PROVENTIL HFA, VENTOLIN HFA, PROAIR HFA) 90 mcg/actuation inhaler; TAKE 1 PUFF BY MOUTH EVERY 4 HOURS AS NEEDED FOR WHEEZING    2. Situational anxiety        Follow-up and Dispositions    · Return in about 6 months (around 3/3/2022), or if symptoms worsen or fail to improve, for Follow up. Subjective:     Patient was seen for a follow up. Reports that his asthma is under control. Has not had a flare in several months. Reports the combination is working well. States that the costochondritis took a few weeks to settle, but eventually did. Reports no CP, or SOB. Has not has this happen since. Does reports that a few times a month he will wake up with up suddenly and with heart palpitations. Describes that he often has to \"calm himself down\". Reports that he is not sure about stress and denies night terrors or hallucinations. Reports that this eventually subsides. Prior to Admission medications    Medication Sig Start Date End Date Taking? Authorizing Provider   montelukast (SINGULAIR) 10 mg tablet Take 1 Tablet by mouth daily.  9/3/21  Yes Cuauhtemoc Suarez NP   fluticasone propion-salmeteroL (Wixela Inhub) 250-50 mcg/dose diskus inhaler USE 1 INHALATION BY MOUTH  TWICE DAILY 9/3/21  Yes Cuauhtemoc Suarez NP   albuterol (PROVENTIL VENTOLIN) 2.5 mg /3 mL (0.083 %) nebu Take as needed every 4 hours as needed 9/3/21  Yes Elvia Suarez NP   albuterol (PROVENTIL HFA, VENTOLIN HFA, PROAIR HFA) 90 mcg/actuation inhaler TAKE 1 PUFF BY MOUTH EVERY 4 HOURS AS NEEDED FOR WHEEZING 9/3/21  Yes Elvia Suarez NP   fluticasone propionate (FLONASE) 50 mcg/actuation nasal spray USE 1 SPRAY IN EACH NOSTRIL TWICE DAILY 12/17/20  Yes Provider, Historical   albuterol (PROVENTIL HFA, VENTOLIN HFA, PROAIR HFA) 90 mcg/actuation inhaler TAKE 1 PUFF BY MOUTH EVERY 4 HOURS AS NEEDED FOR WHEEZING 6/14/21 9/3/21  Timothy German NP   predniSONE (STERAPRED DS) 10 mg dose pack Take 1 Tab by mouth See Admin Instructions. See administration instruction per 10mg dose pack  Patient not taking: Reported on 9/3/2021 1/21/21   Hao Lockhart NP   albuterol (PROVENTIL HFA, VENTOLIN HFA, PROAIR HFA) 90 mcg/actuation inhaler TAKE 1 PUFF BY MOUTH EVERY 4 HOURS AS NEEDED FOR WHEEZING 1/15/21   Jenniferryl REG German   albuterol (PROVENTIL VENTOLIN) 2.5 mg /3 mL (0.083 %) nebu INHALE 1 VIAL VIA NEBULIZER THREE TIMES DAILY 1/15/21 9/3/21  Merryl Blind NP   montelukast (SINGULAIR) 10 mg tablet TAKE 1 TABLET BY MOUTH  DAILY 9/16/20 9/3/21  MD Feliz Macias 250-50 mcg/dose diskus inhaler USE 1 INHALATION BY MOUTH  TWICE DAILY 9/16/20 9/3/21  Anthony Le MD     Patient Active Problem List   Diagnosis Code    Asthma J45.909     Patient Active Problem List    Diagnosis Date Noted    Asthma 03/11/2018     Current Outpatient Medications   Medication Sig Dispense Refill    montelukast (SINGULAIR) 10 mg tablet Take 1 Tablet by mouth daily.  90 Tablet 3    fluticasone propion-salmeteroL (Wixela Inhub) 250-50 mcg/dose diskus inhaler USE 1 INHALATION BY MOUTH  TWICE DAILY 60 Each 11    albuterol (PROVENTIL VENTOLIN) 2.5 mg /3 mL (0.083 %) nebu Take as needed every 4 hours as needed 150 mL 1    albuterol (PROVENTIL HFA, VENTOLIN HFA, PROAIR HFA) 90 mcg/actuation inhaler TAKE 1 PUFF BY MOUTH EVERY 4 HOURS AS NEEDED FOR WHEEZING 6.7 g 2    fluticasone propionate (FLONASE) 50 mcg/actuation nasal spray USE 1 SPRAY IN EACH NOSTRIL TWICE DAILY      predniSONE (STERAPRED DS) 10 mg dose pack Take 1 Tab by mouth See Admin Instructions. See administration instruction per 10mg dose pack (Patient not taking: Reported on 9/3/2021) 21 Tab 0     No Known Allergies  Past Medical History:   Diagnosis Date    Anemia     Hypertension     MRSA (methicillin resistant Staphylococcus aureus) 06/2018     Past Surgical History:   Procedure Laterality Date    IR DRAIN HEMATOMA SEROMA FLUID  06/2018     Family History   Problem Relation Age of Onset    Asthma Mother     Cancer Mother     Hypertension Mother     Asthma Sister      Social History     Tobacco Use    Smoking status: Never Smoker    Smokeless tobacco: Never Used   Substance Use Topics    Alcohol use: Yes     Alcohol/week: 1.0 standard drinks     Types: 1 Cans of beer per week       Review of Systems   Constitutional: Negative. Respiratory: Negative. Cardiovascular: Negative. Gastrointestinal: Negative. Neurological: Negative. Psychiatric/Behavioral: Negative.         Objective:     Patient-Reported Vitals 9/3/2021   Patient-Reported Weight 200   Patient-Reported Height 5'11\"   Patient-Reported Pulse 64   Patient-Reported Temperature 97.1   Patient-Reported SpO2 97   Patient-Reported Systolic  213   Patient-Reported Diastolic 78        [INSTRUCTIONS:  \"[x]\" Indicates a positive item  \"[]\" Indicates a negative item  -- DELETE ALL ITEMS NOT EXAMINED]    Constitutional: [x] Appears well-developed and well-nourished [x] No apparent distress      [] Abnormal -     Mental status: [x] Alert and awake  [x] Oriented to person/place/time [x] Able to follow commands    [] Abnormal -     Eyes:   EOM    [x]  Normal    [] Abnormal -   Sclera  [x]  Normal    [] Abnormal -          Discharge [x]  None visible   [] Abnormal -     HENT: [x] Normocephalic, atraumatic  [] Abnormal -   [x] Mouth/Throat: Mucous membranes are moist    External Ears [x] Normal  [] Abnormal -    Neck: [x] No visualized mass [] Abnormal -     Pulmonary/Chest: [x] Respiratory effort normal   [x] No visualized signs of difficulty breathing or respiratory distress        [] Abnormal -      Musculoskeletal:   [x] Normal gait with no signs of ataxia         [x] Normal range of motion of neck        [] Abnormal -     Neurological:        [x] No Facial Asymmetry (Cranial nerve 7 motor function) (limited exam due to video visit)          [x] No gaze palsy        [] Abnormal -          Skin:        [x] No significant exanthematous lesions or discoloration noted on facial skin         [] Abnormal -            Psychiatric:       [x] Normal Affect [] Abnormal -        [x] No Hallucinations    Other pertinent observable physical exam findings:-        We discussed the expected course, resolution and complications of the diagnosis(es) in detail. Medication risks, benefits, costs, interactions, and alternatives were discussed as indicated. I advised him to contact the office if his condition worsens, changes or fails to improve as anticipated. He expressed understanding with the diagnosis(es) and plan. Hiren Shetty, was evaluated through a synchronous (real-time) audio-video encounter. The patient (or guardian if applicable) is aware that this is a billable service. Verbal consent to proceed has been obtained within the past 12 months. The visit was conducted pursuant to the emergency declaration under the [facility] [address] and the [employer] General Act. Patient identification was verified, and a caregiver was present when appropriate. The patient was located in a state where the provider was credentialed to provide care.       Cristin Bowman NP

## 2021-09-03 NOTE — PROGRESS NOTES
Identified pt with two pt identifiers(name and ). Reviewed record in preparation for visit and have obtained necessary documentation. Chief Complaint   Patient presents with    Medication Refill        Health Maintenance Due   Topic    Hepatitis C Screening     COVID-19 Vaccine (1)    DTaP/Tdap/Td series (1 - Tdap)    Flu Vaccine (1)      There were no vitals taken for this visit. Pain Scale: /10    Coordination of Care Questionnaire:  :   1. Have you been to the ER, urgent care clinic since your last visit? Hospitalized since your last visit? No    2. Have you seen or consulted any other health care providers outside of the [address] [place] [address] since your last visit? Include any pap smears or colon screening.  UNsure

## 2021-09-07 DIAGNOSIS — J45.41 MODERATE PERSISTENT ASTHMA WITH ACUTE EXACERBATION: ICD-10-CM

## 2021-09-07 RX ORDER — ALBUTEROL SULFATE 0.83 MG/ML
SOLUTION RESPIRATORY (INHALATION)
Qty: 150 ML | Refills: 1 | Status: SHIPPED | OUTPATIENT
Start: 2021-09-07 | End: 2022-03-01

## 2021-09-09 ENCOUNTER — TELEPHONE (OUTPATIENT)
Dept: INTERNAL MEDICINE CLINIC | Age: 41
End: 2021-09-09

## 2022-03-01 DIAGNOSIS — J45.41 MODERATE PERSISTENT ASTHMA WITH ACUTE EXACERBATION: ICD-10-CM

## 2022-03-01 RX ORDER — ALBUTEROL SULFATE 0.83 MG/ML
SOLUTION RESPIRATORY (INHALATION)
Qty: 300 ML | Refills: 1 | Status: SHIPPED | OUTPATIENT
Start: 2022-03-01

## 2022-03-20 PROBLEM — J45.909 ASTHMA: Status: ACTIVE | Noted: 2018-03-11

## 2022-07-29 DIAGNOSIS — J45.41 MODERATE PERSISTENT ASTHMA WITH ACUTE EXACERBATION: ICD-10-CM

## 2022-07-30 RX ORDER — MONTELUKAST SODIUM 10 MG/1
10 TABLET ORAL DAILY
Qty: 90 TABLET | Refills: 3 | Status: SHIPPED | OUTPATIENT
Start: 2022-07-30

## 2022-09-07 ENCOUNTER — OFFICE VISIT (OUTPATIENT)
Dept: INTERNAL MEDICINE CLINIC | Age: 42
End: 2022-09-07
Payer: COMMERCIAL

## 2022-09-07 VITALS
HEIGHT: 71 IN | RESPIRATION RATE: 12 BRPM | HEART RATE: 77 BPM | DIASTOLIC BLOOD PRESSURE: 80 MMHG | SYSTOLIC BLOOD PRESSURE: 132 MMHG | WEIGHT: 224 LBS | TEMPERATURE: 98.1 F | BODY MASS INDEX: 31.36 KG/M2 | OXYGEN SATURATION: 99 %

## 2022-09-07 DIAGNOSIS — R10.12 LEFT UPPER QUADRANT ABDOMINAL PAIN: Primary | ICD-10-CM

## 2022-09-07 PROCEDURE — 99213 OFFICE O/P EST LOW 20 MIN: CPT | Performed by: INTERNAL MEDICINE

## 2022-09-07 NOTE — PROGRESS NOTES
Health Maintenance Due   Topic Date Due    Hepatitis C Screening  Never done    COVID-19 Vaccine (1) Never done    Pneumococcal 0-64 years (1 - PCV) Never done    DTaP/Tdap/Td series (1 - Tdap) Never done    Flu Vaccine (1) 09/01/2022    Depression Screen  09/03/2022       Chief Complaint   Patient presents with    Abdominal Pain     Upper left quadrant is where the Pt is experiencing a little bit of pain that lingers. Asthma       1. Have you been to the ER, urgent care clinic since your last visit? Hospitalized since your last visit? No    2. Have you seen or consulted any other health care providers outside of the [address] [place] [address] since your last visit? Include any pap smears or colon screening. No    3) Do you have an Advance Directive on file? no    4) Are you interested in receiving information on Advance Directives? NO      Patient is accompanied by self I have received verbal consent from Robel Schrader to discuss any/all medical information while they are present in the room.

## 2022-09-07 NOTE — PROGRESS NOTES
HISTORY OF PRESENT ILLNESS  Tamiko Dickey is a 38 y.o. male. Patient was seen after he has had ongoing left upper abdominal pain for 2 weeks. Describes the pain as sharp and dull on and off. Has a history of reflux. Tried to limit aggravating foods, but that made no change. There is pain on palpation. No fever. Denies any nausea, vomiting or diarrhea. No changes in BM. Also reports some CP. That comes and goes and feels like his asthma not controlled. This is not current. This does radiate. Has taken antiacids and sometimes this does help. Feels like the pain of his stomach does move up at times.    Visit Vitals  /80 (BP 1 Location: Left upper arm, BP Patient Position: Sitting, BP Cuff Size: Adult)   Pulse 77   Temp 98.1 °F (36.7 °C) (Oral)   Resp 12   Ht 5' 11\" (1.803 m)   Wt 224 lb (101.6 kg)   SpO2 99%   BMI 31.24 kg/m²     Past Medical History:   Diagnosis Date    Anemia     Hypertension     MRSA (methicillin resistant Staphylococcus aureus) 06/2018     Past Surgical History:   Procedure Laterality Date    IR DRAIN HEMATOMA SEROMA FLUID  06/2018     Past Surgical History:   Procedure Laterality Date    IR DRAIN HEMATOMA SEROMA FLUID  06/2018     Family History   Problem Relation Age of Onset    Asthma Mother     Cancer Mother     Hypertension Mother     Asthma Sister      Outpatient Encounter Medications as of 9/7/2022   Medication Sig Dispense Refill    montelukast (SINGULAIR) 10 mg tablet TAKE 1 TABLET BY MOUTH  DAILY 90 Tablet 3    albuterol (PROVENTIL VENTOLIN) 2.5 mg /3 mL (0.083 %) nebu USE 1 VIAL VIA NEBULIZER  EVERY 4 HOURS AS NEEDED  ( RECOMMENDS  NOT EXCEEDING 4 VIALS/DAY) 300 mL 1    fluticasone propion-salmeteroL (Wixela Inhub) 250-50 mcg/dose diskus inhaler USE 1 INHALATION BY MOUTH  TWICE DAILY 60 Each 11    albuterol (PROVENTIL HFA, VENTOLIN HFA, PROAIR HFA) 90 mcg/actuation inhaler TAKE 1 PUFF BY MOUTH EVERY 4 HOURS AS NEEDED FOR WHEEZING 6.7 g 2    fluticasone propionate (FLONASE) 50 mcg/actuation nasal spray USE 1 SPRAY IN EACH NOSTRIL TWICE DAILY       No facility-administered encounter medications on file as of 9/7/2022. HPI    Review of Systems   Constitutional: Negative. Respiratory: Negative. Cardiovascular: Negative. Previous CP   Gastrointestinal:  Positive for abdominal pain. Negative for constipation, nausea and vomiting. Genitourinary: Negative. Neurological: Negative. Physical Exam  Vitals and nursing note reviewed. Constitutional:       General: He is not in acute distress. Appearance: He is not ill-appearing. Cardiovascular:      Rate and Rhythm: Normal rate and regular rhythm. Pulses: Normal pulses. Pulmonary:      Effort: Pulmonary effort is normal. No respiratory distress. Breath sounds: Normal breath sounds. Chest:      Chest wall: No tenderness. Abdominal:      General: Bowel sounds are normal.      Palpations: Abdomen is soft. Tenderness: There is abdominal tenderness in the left upper quadrant. There is no right CVA tenderness or left CVA tenderness. Negative signs include Umanzor's sign, Rovsing's sign, psoas sign and obturator sign. Skin:     General: Skin is warm. Neurological:      Mental Status: He is alert and oriented to person, place, and time. Psychiatric:         Mood and Affect: Mood is anxious. ASSESSMENT and PLAN  Diagnoses and all orders for this visit:    1. Left upper quadrant abdominal pain  -     CT ABD W WO CONT; Future          Offered an EKG for the past CP. Patient wanted to wait.      Follow-up and Dispositions    Return if symptoms worsen or fail to improve.       lab results and schedule of future lab studies reviewed with patient  reviewed diet, exercise and weight control  reviewed medications and side effects in detail

## 2022-09-27 ENCOUNTER — HOSPITAL ENCOUNTER (OUTPATIENT)
Dept: CT IMAGING | Age: 42
Discharge: HOME | End: 2022-09-27
Attending: INTERNAL MEDICINE
Payer: COMMERCIAL

## 2022-09-27 DIAGNOSIS — R10.12 LEFT UPPER QUADRANT ABDOMINAL PAIN: ICD-10-CM

## 2022-09-27 PROCEDURE — 74160 CT ABDOMEN W/CONTRAST: CPT

## 2022-09-27 PROCEDURE — 74011000636 HC RX REV CODE- 636: Performed by: INTERNAL MEDICINE

## 2022-09-27 RX ADMIN — IOPAMIDOL 100 ML: 755 INJECTION, SOLUTION INTRAVENOUS at 09:01

## 2022-10-06 DIAGNOSIS — J45.41 MODERATE PERSISTENT ASTHMA WITH ACUTE EXACERBATION: ICD-10-CM

## 2022-10-07 RX ORDER — FLUTICASONE PROPIONATE AND SALMETEROL 250; 50 UG/1; UG/1
POWDER RESPIRATORY (INHALATION)
Qty: 180 EACH | Refills: 3 | Status: SHIPPED | OUTPATIENT
Start: 2022-10-07

## 2023-01-30 ENCOUNTER — TELEPHONE (OUTPATIENT)
Dept: INTERNAL MEDICINE CLINIC | Age: 43
End: 2023-01-30

## 2023-01-30 NOTE — TELEPHONE ENCOUNTER
Marinell Boeck was called and verbalized understanding on note below. Informed patient that guidelines for severe abdominal pain (including symptoms of fever, severe pain, bleeding, bowel trouble, right-sided pain) would warrant an ED or UC visit today.

## 2023-01-30 NOTE — TELEPHONE ENCOUNTER
Patient's girlfriend called stating that patient is having severe abdominal pains. She thinks it may be colitis. I tried to find an appointment for this week but at the time nothing was available. Please call patient back at [phone] to discuss.  He was already scheduled for 02/09/2023

## 2023-02-09 ENCOUNTER — OFFICE VISIT (OUTPATIENT)
Dept: INTERNAL MEDICINE CLINIC | Age: 43
End: 2023-02-09
Payer: COMMERCIAL

## 2023-02-09 VITALS
RESPIRATION RATE: 16 BRPM | HEIGHT: 71 IN | DIASTOLIC BLOOD PRESSURE: 80 MMHG | HEART RATE: 79 BPM | OXYGEN SATURATION: 99 % | WEIGHT: 226 LBS | BODY MASS INDEX: 31.64 KG/M2 | SYSTOLIC BLOOD PRESSURE: 132 MMHG | TEMPERATURE: 98 F

## 2023-02-09 DIAGNOSIS — R06.81 BREATHLESSNESS: ICD-10-CM

## 2023-02-09 DIAGNOSIS — R05.9 COUGH, UNSPECIFIED TYPE: ICD-10-CM

## 2023-02-09 DIAGNOSIS — R10.13 EPIGASTRIC ABDOMINAL PAIN: Primary | ICD-10-CM

## 2023-02-09 DIAGNOSIS — J45.20 MILD INTERMITTENT ASTHMA WITHOUT COMPLICATION: ICD-10-CM

## 2023-02-09 PROCEDURE — 99213 OFFICE O/P EST LOW 20 MIN: CPT | Performed by: INTERNAL MEDICINE

## 2023-02-09 RX ORDER — BUDESONIDE AND FORMOTEROL FUMARATE DIHYDRATE 160; 4.5 UG/1; UG/1
2 AEROSOL RESPIRATORY (INHALATION) 2 TIMES DAILY
Qty: 10.2 G | Refills: 1 | Status: SHIPPED | OUTPATIENT
Start: 2023-02-09

## 2023-02-09 RX ORDER — DICYCLOMINE HYDROCHLORIDE 10 MG/1
10 CAPSULE ORAL 3 TIMES DAILY
Qty: 30 CAPSULE | Refills: 1 | Status: SHIPPED | OUTPATIENT
Start: 2023-02-09

## 2023-02-09 RX ORDER — PANTOPRAZOLE SODIUM 20 MG/1
20 TABLET, DELAYED RELEASE ORAL DAILY
Qty: 30 TABLET | Refills: 1 | Status: SHIPPED | OUTPATIENT
Start: 2023-02-09

## 2023-02-09 NOTE — PROGRESS NOTES
Health Maintenance Due   Topic Date Due    Hepatitis C Screening  Never done    COVID-19 Vaccine (1) Never done    Pneumococcal 0-64 years (1 - PCV) Never done    DTaP/Tdap/Td series (1 - Tdap) Never done    Flu Vaccine (1) 08/01/2022       Chief Complaint   Patient presents with    Abdominal Pain    Cough       1. Have you been to the ER, urgent care clinic since your last visit? Hospitalized since your last visit? Yes, [facility], [facility]    2. Have you seen or consulted any other health care providers outside of the [address] [place] [address] since your last visit? Include any pap smears or colon screening. No    3) Do you have an Advance Directive on file? no    4) Are you interested in receiving information on Advance Directives? NO      Patient is accompanied by self I have received verbal consent from Gen Gunderson to discuss any/all medical information while they are present in the room.

## 2023-02-09 NOTE — PROGRESS NOTES
HISTORY OF PRESENT ILLNESS  Marquis Denny is a 41 y.o. male. Patient was seen after he continues to have what now is epigastric pain. Was having more left side abd pain this fall. CT of the abd showed fatty liver. Reports that the sensation is now more center and fullness and pain that pushed upward. This gives him a lot og stomach pain and full like sensation. No fever, but this is increased when eating. Also reports that he is being treated for bronchitis. Has felt like his asthma has not been controlled for a few months. Is taking Advair daily and Singulair but this does not seem to help. Reports that he gets breathless when walking. No CP. Attempting to watch his diet at times and has gained weight. Notices that he cannot lay flat without a cough. Since being treated for bronchitis the the muscle pain has reduced after hacking a lot. Visit Vitals  BP (!) 148/88   Pulse 79   Temp 98 °F (36.7 °C) (Oral)   Resp 16   Ht 5' 11\" (1.803 m)   Wt 226 lb (102.5 kg)   SpO2 99%   BMI 31.52 kg/m²     Past Medical History:   Diagnosis Date    Anemia     Hypertension     MRSA (methicillin resistant Staphylococcus aureus) 06/2018     Past Surgical History:   Procedure Laterality Date    IR DRAIN HEMATOMA SEROMA FLUID  06/2018     Family History   Problem Relation Age of Onset    Asthma Mother     Cancer Mother     Hypertension Mother     Asthma Sister      Outpatient Encounter Medications as of 2/9/2023   Medication Sig Dispense Refill    pantoprazole (PROTONIX) 20 mg tablet Take 1 Tablet by mouth daily. 30 Tablet 1    dicyclomine (BENTYL) 10 mg capsule Take 1 Capsule by mouth three (3) times daily. 30 Capsule 1    budesonide-formoteroL (SYMBICORT) 160-4.5 mcg/actuation HFAA Take 2 Puffs by inhalation two (2) times a day.  10.2 g 1    montelukast (SINGULAIR) 10 mg tablet TAKE 1 TABLET BY MOUTH  DAILY 90 Tablet 3    albuterol (PROVENTIL VENTOLIN) 2.5 mg /3 mL (0.083 %) nebu USE 1 VIAL VIA NEBULIZER  EVERY 4 HOURS AS NEEDED ( RECOMMENDS  NOT EXCEEDING 4 VIALS/DAY) 300 mL 1    albuterol (PROVENTIL HFA, VENTOLIN HFA, PROAIR HFA) 90 mcg/actuation inhaler TAKE 1 PUFF BY MOUTH EVERY 4 HOURS AS NEEDED FOR WHEEZING 6.7 g 2    fluticasone propionate (FLONASE) 50 mcg/actuation nasal spray USE 1 SPRAY IN EACH NOSTRIL TWICE DAILY      [DISCONTINUED] Wixela Inhub 250-50 mcg/dose diskus inhaler USE 1 INHALATION BY MOUTH  TWICE DAILY 180 Each 3     No facility-administered encounter medications on file as of 2/9/2023. Abdominal Pain  Associated symptoms include abdominal pain and shortness of breath. Pertinent negatives include no chest pain. Cough  Associated symptoms include abdominal pain and shortness of breath. Pertinent negatives include no chest pain. Review of Systems   Constitutional: Negative. HENT: Negative. Respiratory:  Positive for cough and shortness of breath. Cardiovascular:  Negative for chest pain and palpitations. Gastrointestinal:  Positive for abdominal pain. Negative for constipation, diarrhea, nausea and vomiting. Musculoskeletal: Negative. Neurological: Negative. Psychiatric/Behavioral: Negative. Physical Exam  Vitals and nursing note reviewed. Constitutional:       General: He is not in acute distress. Appearance: He is not toxic-appearing. Cardiovascular:      Rate and Rhythm: Normal rate and regular rhythm. Pulmonary:      Effort: Pulmonary effort is normal.      Breath sounds: Normal breath sounds. No wheezing. Abdominal:      General: Bowel sounds are normal.      Palpations: Abdomen is soft. Tenderness: There is no guarding. Negative signs include Umanzor's sign and psoas sign. Comments: Describes full like pressure when palpated    Musculoskeletal:         General: Normal range of motion. Skin:     General: Skin is warm. Neurological:      Mental Status: He is alert and oriented to person, place, and time.    Psychiatric:         Behavior: Behavior normal.       ASSESSMENT and PLAN  Diagnoses and all orders for this visit:    1. Epigastric abdominal pain  -     CT CHEST ABD PELV W WO CONT; Future  -     pantoprazole (PROTONIX) 20 mg tablet; Take 1 Tablet by mouth daily. -     dicyclomine (BENTYL) 10 mg capsule; Take 1 Capsule by mouth three (3) times daily. -     sees GI at the end of the month   2. Breathlessness  -     CT CHEST ABD PELV W WO CONT; Future  -     budesonide-formoteroL (SYMBICORT) 160-4.5 mcg/actuation HFAA; Take 2 Puffs by inhalation two (2) times a day. -     PULMONARY FUNCTION TEST; Future    3. Cough, unspecified type  -     CT CHEST ABD PELV W WO CONT; Future    4. Mild intermittent asthma without complication  -     budesonide-formoteroL (SYMBICORT) 160-4.5 mcg/actuation HFAA; Take 2 Puffs by inhalation two (2) times a day.   -     PULMONARY FUNCTION TEST; Future        lab results and schedule of future lab studies reviewed with patient  reviewed diet, exercise and weight control  reviewed medications and side effects in detail

## 2023-03-21 ENCOUNTER — HOSPITAL ENCOUNTER (OUTPATIENT)
Dept: PULMONOLOGY | Age: 43
Discharge: HOME | End: 2023-03-21
Attending: INTERNAL MEDICINE
Payer: COMMERCIAL

## 2023-03-21 DIAGNOSIS — J45.20 MILD INTERMITTENT ASTHMA WITHOUT COMPLICATION: ICD-10-CM

## 2023-03-21 DIAGNOSIS — R06.81 BREATHLESSNESS: ICD-10-CM

## 2023-03-21 PROCEDURE — 94010 BREATHING CAPACITY TEST: CPT

## 2023-04-20 ENCOUNTER — HOSPITAL ENCOUNTER (OUTPATIENT)
Dept: GENERAL RADIOLOGY | Age: 43
Discharge: HOME | End: 2023-04-20
Attending: INTERNAL MEDICINE
Payer: COMMERCIAL

## 2023-04-20 ENCOUNTER — TRANSCRIBE ORDER (OUTPATIENT)
Dept: GENERAL RADIOLOGY | Age: 43
End: 2023-04-20

## 2023-04-20 DIAGNOSIS — J45.50 SEVERE PERSISTENT ASTHMA, UNCOMPLICATED: ICD-10-CM

## 2023-04-20 DIAGNOSIS — J45.50 SEVERE PERSISTENT ASTHMA, UNCOMPLICATED: Primary | ICD-10-CM

## 2023-04-20 PROCEDURE — 71046 X-RAY EXAM CHEST 2 VIEWS: CPT

## 2023-05-14 ENCOUNTER — HOSPITAL ENCOUNTER (INPATIENT)
Facility: HOSPITAL | Age: 43
LOS: 2 days | Discharge: HOME | DRG: 872 | End: 2023-05-16
Attending: STUDENT IN AN ORGANIZED HEALTH CARE EDUCATION/TRAINING PROGRAM | Admitting: FAMILY MEDICINE
Payer: COMMERCIAL

## 2023-05-14 ENCOUNTER — APPOINTMENT (OUTPATIENT)
Facility: HOSPITAL | Age: 43
DRG: 872 | End: 2023-05-14
Payer: COMMERCIAL

## 2023-05-14 DIAGNOSIS — K52.9 COLITIS: Primary | ICD-10-CM

## 2023-05-14 DIAGNOSIS — R50.9 ACUTE FEBRILE ILLNESS: ICD-10-CM

## 2023-05-14 DIAGNOSIS — A41.9 SEPTICEMIA (HCC): ICD-10-CM

## 2023-05-14 LAB
ALBUMIN SERPL-MCNC: 4.1 G/DL (ref 3.5–5)
ALBUMIN/GLOB SERPL: 1.1 (ref 1.1–2.2)
ALP SERPL-CCNC: 66 U/L (ref 45–117)
ALT SERPL-CCNC: 109 U/L (ref 12–78)
ANION GAP SERPL CALC-SCNC: 8 MMOL/L (ref 5–15)
APPEARANCE UR: CLEAR
AST SERPL-CCNC: 34 U/L (ref 15–37)
BACTERIA URNS QL MICRO: NEGATIVE /HPF
BILIRUB SERPL-MCNC: 0.7 MG/DL (ref 0.2–1)
BILIRUB UR QL: NEGATIVE
BUN SERPL-MCNC: 12 MG/DL (ref 6–20)
BUN/CREAT SERPL: 11 (ref 12–20)
C DIFF GDH STL QL: NEGATIVE
C DIFF TOX A+B STL QL IA: NEGATIVE
C DIFF TOXIN INTERPRETATION: NORMAL
CALCIUM SERPL-MCNC: 9.5 MG/DL (ref 8.5–10.1)
CHLORIDE SERPL-SCNC: 109 MMOL/L (ref 97–108)
CO2 SERPL-SCNC: 21 MMOL/L (ref 21–32)
COLOR UR: ABNORMAL
COMMENT:: NORMAL
CREAT SERPL-MCNC: 1.13 MG/DL (ref 0.7–1.3)
EPITH CASTS URNS QL MICRO: ABNORMAL /LPF
ERYTHROCYTE [DISTWIDTH] IN BLOOD BY AUTOMATED COUNT: 12.1 % (ref 11.5–14.5)
GLOBULIN SER CALC-MCNC: 3.8 G/DL (ref 2–4)
GLUCOSE SERPL-MCNC: 109 MG/DL (ref 65–100)
GLUCOSE UR STRIP.AUTO-MCNC: NEGATIVE MG/DL
HCT VFR BLD AUTO: 48.2 % (ref 36.6–50.3)
HEMOCCULT STL QL: NEGATIVE
HGB BLD-MCNC: 16.8 G/DL (ref 12.1–17)
HGB UR QL STRIP: NEGATIVE
HYALINE CASTS URNS QL MICRO: ABNORMAL /LPF (ref 0–5)
KETONES UR QL STRIP.AUTO: ABNORMAL MG/DL
LACTATE SERPL-SCNC: 1.9 MMOL/L (ref 0.4–2)
LEUKOCYTE ESTERASE UR QL STRIP.AUTO: NEGATIVE
MCH RBC QN AUTO: 30.4 PG (ref 26–34)
MCHC RBC AUTO-ENTMCNC: 34.9 G/DL (ref 30–36.5)
MCV RBC AUTO: 87.2 FL (ref 80–99)
NITRITE UR QL STRIP.AUTO: NEGATIVE
NRBC # BLD: 0 K/UL (ref 0–0.01)
NRBC BLD-RTO: 0 PER 100 WBC
PH UR STRIP: 5 (ref 5–8)
PLATELET # BLD AUTO: 240 K/UL (ref 150–400)
PMV BLD AUTO: 9.1 FL (ref 8.9–12.9)
POTASSIUM SERPL-SCNC: 3.7 MMOL/L (ref 3.5–5.1)
PROT SERPL-MCNC: 7.9 G/DL (ref 6.4–8.2)
PROT UR STRIP-MCNC: ABNORMAL MG/DL
RBC # BLD AUTO: 5.53 M/UL (ref 4.1–5.7)
RBC #/AREA URNS HPF: ABNORMAL /HPF (ref 0–5)
SODIUM SERPL-SCNC: 138 MMOL/L (ref 136–145)
SP GR UR REFRACTOMETRY: 1.01 (ref 1–1.03)
SPECIMEN HOLD: NORMAL
SPECIMEN HOLD: NORMAL
UROBILINOGEN UR QL STRIP.AUTO: 0.2 EU/DL (ref 0.2–1)
WBC # BLD AUTO: 14.3 K/UL (ref 4.1–11.1)
WBC URNS QL MICRO: ABNORMAL /HPF (ref 0–4)

## 2023-05-14 PROCEDURE — 2580000003 HC RX 258: Performed by: FAMILY MEDICINE

## 2023-05-14 PROCEDURE — 87449 NOS EACH ORGANISM AG IA: CPT

## 2023-05-14 PROCEDURE — 80053 COMPREHEN METABOLIC PANEL: CPT

## 2023-05-14 PROCEDURE — 6360000002 HC RX W HCPCS

## 2023-05-14 PROCEDURE — 82272 OCCULT BLD FECES 1-3 TESTS: CPT

## 2023-05-14 PROCEDURE — 87506 IADNA-DNA/RNA PROBE TQ 6-11: CPT

## 2023-05-14 PROCEDURE — 74177 CT ABD & PELVIS W/CONTRAST: CPT

## 2023-05-14 PROCEDURE — 2500000003 HC RX 250 WO HCPCS

## 2023-05-14 PROCEDURE — 96375 TX/PRO/DX INJ NEW DRUG ADDON: CPT

## 2023-05-14 PROCEDURE — 6370000000 HC RX 637 (ALT 250 FOR IP): Performed by: FAMILY MEDICINE

## 2023-05-14 PROCEDURE — 1100000000 HC RM PRIVATE

## 2023-05-14 PROCEDURE — 87040 BLOOD CULTURE FOR BACTERIA: CPT

## 2023-05-14 PROCEDURE — 2580000003 HC RX 258

## 2023-05-14 PROCEDURE — 99285 EMERGENCY DEPT VISIT HI MDM: CPT

## 2023-05-14 PROCEDURE — 81001 URINALYSIS AUTO W/SCOPE: CPT

## 2023-05-14 PROCEDURE — 6360000004 HC RX CONTRAST MEDICATION: Performed by: NURSE PRACTITIONER

## 2023-05-14 PROCEDURE — 6360000002 HC RX W HCPCS: Performed by: FAMILY MEDICINE

## 2023-05-14 PROCEDURE — 85027 COMPLETE CBC AUTOMATED: CPT

## 2023-05-14 PROCEDURE — 83605 ASSAY OF LACTIC ACID: CPT

## 2023-05-14 PROCEDURE — 96374 THER/PROPH/DIAG INJ IV PUSH: CPT

## 2023-05-14 PROCEDURE — 36415 COLL VENOUS BLD VENIPUNCTURE: CPT

## 2023-05-14 PROCEDURE — 87324 CLOSTRIDIUM AG IA: CPT

## 2023-05-14 RX ORDER — SODIUM CHLORIDE 0.9 % (FLUSH) 0.9 %
5-40 SYRINGE (ML) INJECTION EVERY 12 HOURS SCHEDULED
Status: DISCONTINUED | OUTPATIENT
Start: 2023-05-14 | End: 2023-05-16 | Stop reason: HOSPADM

## 2023-05-14 RX ORDER — SODIUM CHLORIDE 9 MG/ML
INJECTION, SOLUTION INTRAVENOUS PRN
Status: DISCONTINUED | OUTPATIENT
Start: 2023-05-14 | End: 2023-05-16 | Stop reason: HOSPADM

## 2023-05-14 RX ORDER — 0.9 % SODIUM CHLORIDE 0.9 %
1000 INTRAVENOUS SOLUTION INTRAVENOUS ONCE
Status: COMPLETED | OUTPATIENT
Start: 2023-05-14 | End: 2023-05-14

## 2023-05-14 RX ORDER — KETOROLAC TROMETHAMINE 30 MG/ML
30 INJECTION, SOLUTION INTRAMUSCULAR; INTRAVENOUS EVERY 6 HOURS PRN
Status: DISCONTINUED | OUTPATIENT
Start: 2023-05-14 | End: 2023-05-16 | Stop reason: HOSPADM

## 2023-05-14 RX ORDER — METRONIDAZOLE 500 MG/100ML
500 INJECTION, SOLUTION INTRAVENOUS ONCE
Status: COMPLETED | OUTPATIENT
Start: 2023-05-14 | End: 2023-05-14

## 2023-05-14 RX ORDER — MORPHINE SULFATE 4 MG/ML
4 INJECTION, SOLUTION INTRAMUSCULAR; INTRAVENOUS ONCE
Status: COMPLETED | OUTPATIENT
Start: 2023-05-14 | End: 2023-05-14

## 2023-05-14 RX ORDER — KETOROLAC TROMETHAMINE 30 MG/ML
30 INJECTION, SOLUTION INTRAMUSCULAR; INTRAVENOUS ONCE
Status: COMPLETED | OUTPATIENT
Start: 2023-05-14 | End: 2023-05-14

## 2023-05-14 RX ORDER — OXYCODONE HYDROCHLORIDE AND ACETAMINOPHEN 5; 325 MG/1; MG/1
1 TABLET ORAL EVERY 6 HOURS PRN
Status: DISCONTINUED | OUTPATIENT
Start: 2023-05-14 | End: 2023-05-16 | Stop reason: HOSPADM

## 2023-05-14 RX ORDER — METRONIDAZOLE 500 MG/100ML
500 INJECTION, SOLUTION INTRAVENOUS EVERY 12 HOURS
Status: DISCONTINUED | OUTPATIENT
Start: 2023-05-15 | End: 2023-05-16

## 2023-05-14 RX ORDER — ONDANSETRON 2 MG/ML
4 INJECTION INTRAMUSCULAR; INTRAVENOUS ONCE
Status: COMPLETED | OUTPATIENT
Start: 2023-05-14 | End: 2023-05-14

## 2023-05-14 RX ORDER — SODIUM CHLORIDE 0.9 % (FLUSH) 0.9 %
5-40 SYRINGE (ML) INJECTION PRN
Status: DISCONTINUED | OUTPATIENT
Start: 2023-05-14 | End: 2023-05-16 | Stop reason: HOSPADM

## 2023-05-14 RX ORDER — KETOROLAC TROMETHAMINE 30 MG/ML
30 INJECTION, SOLUTION INTRAMUSCULAR; INTRAVENOUS ONCE
Status: DISCONTINUED | OUTPATIENT
Start: 2023-05-14 | End: 2023-05-14

## 2023-05-14 RX ORDER — 0.9 % SODIUM CHLORIDE 0.9 %
30 INTRAVENOUS SOLUTION INTRAVENOUS ONCE
Status: COMPLETED | OUTPATIENT
Start: 2023-05-14 | End: 2023-05-14

## 2023-05-14 RX ADMIN — ONDANSETRON 4 MG: 2 INJECTION INTRAMUSCULAR; INTRAVENOUS at 13:43

## 2023-05-14 RX ADMIN — MORPHINE SULFATE 4 MG: 4 INJECTION, SOLUTION INTRAMUSCULAR; INTRAVENOUS at 13:43

## 2023-05-14 RX ADMIN — CEFTRIAXONE SODIUM 2000 MG: 2 INJECTION, POWDER, FOR SOLUTION INTRAMUSCULAR; INTRAVENOUS at 16:32

## 2023-05-14 RX ADMIN — KETOROLAC TROMETHAMINE 30 MG: 30 INJECTION, SOLUTION INTRAMUSCULAR; INTRAVENOUS at 22:59

## 2023-05-14 RX ADMIN — KETOROLAC TROMETHAMINE 30 MG: 30 INJECTION, SOLUTION INTRAMUSCULAR; INTRAVENOUS at 16:32

## 2023-05-14 RX ADMIN — OXYCODONE HYDROCHLORIDE AND ACETAMINOPHEN 1 TABLET: 5; 325 TABLET ORAL at 22:53

## 2023-05-14 RX ADMIN — SODIUM CHLORIDE, PRESERVATIVE FREE 10 ML: 5 INJECTION INTRAVENOUS at 22:45

## 2023-05-14 RX ADMIN — METRONIDAZOLE 500 MG: 500 INJECTION, SOLUTION INTRAVENOUS at 16:40

## 2023-05-14 RX ADMIN — SODIUM CHLORIDE 1000 ML: 9 INJECTION, SOLUTION INTRAVENOUS at 13:43

## 2023-05-14 RX ADMIN — Medication 125 MG: at 22:46

## 2023-05-14 RX ADMIN — IOPAMIDOL 100 ML: 755 INJECTION, SOLUTION INTRAVENOUS at 13:56

## 2023-05-14 RX ADMIN — SODIUM CHLORIDE 2259 ML: 9 INJECTION, SOLUTION INTRAVENOUS at 22:45

## 2023-05-14 RX ADMIN — SODIUM CHLORIDE 1000 ML: 9 INJECTION, SOLUTION INTRAVENOUS at 16:23

## 2023-05-14 ASSESSMENT — PAIN DESCRIPTION - DESCRIPTORS
DESCRIPTORS: ACHING

## 2023-05-14 ASSESSMENT — ENCOUNTER SYMPTOMS
BACK PAIN: 1
SHORTNESS OF BREATH: 0
RHINORRHEA: 0
NAUSEA: 1
ABDOMINAL PAIN: 1
VOMITING: 0
DIARRHEA: 1
BLOOD IN STOOL: 1
SORE THROAT: 0
COUGH: 0

## 2023-05-14 ASSESSMENT — PAIN SCALES - GENERAL
PAINLEVEL_OUTOF10: 5
PAINLEVEL_OUTOF10: 10
PAINLEVEL_OUTOF10: 8
PAINLEVEL_OUTOF10: 5

## 2023-05-14 ASSESSMENT — PAIN DESCRIPTION - ORIENTATION
ORIENTATION: RIGHT;LEFT;LOWER
ORIENTATION: LOWER;RIGHT
ORIENTATION: RIGHT;LOWER

## 2023-05-14 ASSESSMENT — PAIN DESCRIPTION - LOCATION
LOCATION: ABDOMEN

## 2023-05-14 ASSESSMENT — PAIN - FUNCTIONAL ASSESSMENT: PAIN_FUNCTIONAL_ASSESSMENT: 0-10

## 2023-05-15 LAB
ANION GAP SERPL CALC-SCNC: 7 MMOL/L (ref 5–15)
BASOPHILS # BLD: 0.1 K/UL (ref 0–0.1)
BASOPHILS NFR BLD: 1 % (ref 0–1)
BUN SERPL-MCNC: 13 MG/DL (ref 6–20)
BUN/CREAT SERPL: 11 (ref 12–20)
C COLI+JEJUNI TUF STL QL NAA+PROBE: POSITIVE
C COLI+JEJUNI TUF STL QL NAA+PROBE: POSITIVE
CALCIUM SERPL-MCNC: 7.7 MG/DL (ref 8.5–10.1)
CHLORIDE SERPL-SCNC: 110 MMOL/L (ref 97–108)
CO2 SERPL-SCNC: 23 MMOL/L (ref 21–32)
CREAT SERPL-MCNC: 1.17 MG/DL (ref 0.7–1.3)
DIFFERENTIAL METHOD BLD: ABNORMAL
EC STX1+STX2 GENES STL QL NAA+PROBE: NEGATIVE
EC STX1+STX2 GENES STL QL NAA+PROBE: NEGATIVE
EOSINOPHIL # BLD: 0 K/UL (ref 0–0.4)
EOSINOPHIL NFR BLD: 0 % (ref 0–7)
ERYTHROCYTE [DISTWIDTH] IN BLOOD BY AUTOMATED COUNT: 12.6 % (ref 11.5–14.5)
ETEC ELTA+ESTB GENES STL QL NAA+PROBE: NEGATIVE
ETEC ELTA+ESTB GENES STL QL NAA+PROBE: NEGATIVE
GLUCOSE SERPL-MCNC: 100 MG/DL (ref 65–100)
HCT VFR BLD AUTO: 41.8 % (ref 36.6–50.3)
HGB BLD-MCNC: 13.9 G/DL (ref 12.1–17)
IMM GRANULOCYTES # BLD AUTO: 0 K/UL
IMM GRANULOCYTES NFR BLD AUTO: 0 %
LYMPHOCYTES # BLD: 1 K/UL (ref 0.8–3.5)
LYMPHOCYTES NFR BLD: 9 % (ref 12–49)
MAGNESIUM SERPL-MCNC: 1.5 MG/DL (ref 1.6–2.4)
MCH RBC QN AUTO: 30.2 PG (ref 26–34)
MCHC RBC AUTO-ENTMCNC: 33.3 G/DL (ref 30–36.5)
MCV RBC AUTO: 90.7 FL (ref 80–99)
METAMYELOCYTES NFR BLD MANUAL: 3 %
MONOCYTES # BLD: 0.1 K/UL (ref 0–1)
MONOCYTES NFR BLD: 1 % (ref 5–13)
NEUTS BAND NFR BLD MANUAL: 1 % (ref 0–6)
NEUTS SEG # BLD: 9.9 K/UL (ref 1.8–8)
NEUTS SEG NFR BLD: 85 % (ref 32–75)
NRBC # BLD: 0 K/UL (ref 0–0.01)
NRBC BLD-RTO: 0 PER 100 WBC
P SHIGELLOIDES DNA STL QL NAA+PROBE: NEGATIVE
P SHIGELLOIDES DNA STL QL NAA+PROBE: NEGATIVE
PHOSPHATE SERPL-MCNC: 4 MG/DL (ref 2.6–4.7)
PLATELET # BLD AUTO: 171 K/UL (ref 150–400)
PMV BLD AUTO: 9.1 FL (ref 8.9–12.9)
POTASSIUM SERPL-SCNC: 3.5 MMOL/L (ref 3.5–5.1)
RBC # BLD AUTO: 4.61 M/UL (ref 4.1–5.7)
RBC MORPH BLD: ABNORMAL
SALMONELLA SP SPAO STL QL NAA+PROBE: NEGATIVE
SALMONELLA SP SPAO STL QL NAA+PROBE: NEGATIVE
SHIGELLA SP+EIEC IPAH STL QL NAA+PROBE: NEGATIVE
SHIGELLA SP+EIEC IPAH STL QL NAA+PROBE: NEGATIVE
SODIUM SERPL-SCNC: 140 MMOL/L (ref 136–145)
V CHOL+PARA+VUL DNA STL QL NAA+NON-PROBE: NEGATIVE
V CHOL+PARA+VUL DNA STL QL NAA+NON-PROBE: NEGATIVE
WBC # BLD AUTO: 11.5 K/UL (ref 4.1–11.1)
Y ENTEROCOL DNA STL QL NAA+NON-PROBE: NEGATIVE
Y ENTEROCOL DNA STL QL NAA+NON-PROBE: NEGATIVE

## 2023-05-15 PROCEDURE — 2580000003 HC RX 258: Performed by: FAMILY MEDICINE

## 2023-05-15 PROCEDURE — 2580000003 HC RX 258

## 2023-05-15 PROCEDURE — 2580000003 HC RX 258: Performed by: NURSE PRACTITIONER

## 2023-05-15 PROCEDURE — 80048 BASIC METABOLIC PNL TOTAL CA: CPT

## 2023-05-15 PROCEDURE — C9113 INJ PANTOPRAZOLE SODIUM, VIA: HCPCS

## 2023-05-15 PROCEDURE — 94640 AIRWAY INHALATION TREATMENT: CPT

## 2023-05-15 PROCEDURE — 84100 ASSAY OF PHOSPHORUS: CPT

## 2023-05-15 PROCEDURE — 87506 IADNA-DNA/RNA PROBE TQ 6-11: CPT

## 2023-05-15 PROCEDURE — 85025 COMPLETE CBC W/AUTO DIFF WBC: CPT

## 2023-05-15 PROCEDURE — 6360000002 HC RX W HCPCS: Performed by: FAMILY MEDICINE

## 2023-05-15 PROCEDURE — A4216 STERILE WATER/SALINE, 10 ML: HCPCS

## 2023-05-15 PROCEDURE — 6370000000 HC RX 637 (ALT 250 FOR IP): Performed by: FAMILY MEDICINE

## 2023-05-15 PROCEDURE — 2500000003 HC RX 250 WO HCPCS: Performed by: FAMILY MEDICINE

## 2023-05-15 PROCEDURE — 6370000000 HC RX 637 (ALT 250 FOR IP): Performed by: NURSE PRACTITIONER

## 2023-05-15 PROCEDURE — 6360000002 HC RX W HCPCS

## 2023-05-15 PROCEDURE — 83735 ASSAY OF MAGNESIUM: CPT

## 2023-05-15 PROCEDURE — 1100000000 HC RM PRIVATE

## 2023-05-15 PROCEDURE — 36415 COLL VENOUS BLD VENIPUNCTURE: CPT

## 2023-05-15 RX ORDER — SODIUM CHLORIDE 9 MG/ML
INJECTION, SOLUTION INTRAVENOUS CONTINUOUS
Status: DISCONTINUED | OUTPATIENT
Start: 2023-05-15 | End: 2023-05-16 | Stop reason: HOSPADM

## 2023-05-15 RX ORDER — FLUTICASONE PROPIONATE 50 MCG
1 SPRAY, SUSPENSION (ML) NASAL DAILY
Status: DISCONTINUED | OUTPATIENT
Start: 2023-05-15 | End: 2023-05-16 | Stop reason: HOSPADM

## 2023-05-15 RX ORDER — MONTELUKAST SODIUM 10 MG/1
10 TABLET ORAL DAILY
Status: DISCONTINUED | OUTPATIENT
Start: 2023-05-15 | End: 2023-05-16 | Stop reason: HOSPADM

## 2023-05-15 RX ORDER — ALBUTEROL SULFATE 2.5 MG/3ML
2.5 SOLUTION RESPIRATORY (INHALATION) EVERY 6 HOURS PRN
Status: DISCONTINUED | OUTPATIENT
Start: 2023-05-15 | End: 2023-05-16 | Stop reason: HOSPADM

## 2023-05-15 RX ORDER — ACETAMINOPHEN 325 MG/1
650 TABLET ORAL EVERY 4 HOURS PRN
Status: DISCONTINUED | OUTPATIENT
Start: 2023-05-15 | End: 2023-05-16 | Stop reason: HOSPADM

## 2023-05-15 RX ADMIN — SODIUM CHLORIDE 40 MG: 9 INJECTION INTRAMUSCULAR; INTRAVENOUS; SUBCUTANEOUS at 12:31

## 2023-05-15 RX ADMIN — ACETAMINOPHEN 650 MG: 325 TABLET, FILM COATED ORAL at 06:01

## 2023-05-15 RX ADMIN — SODIUM CHLORIDE: 9 INJECTION, SOLUTION INTRAVENOUS at 02:10

## 2023-05-15 RX ADMIN — ALBUTEROL SULFATE 2.5 MG: 2.5 SOLUTION RESPIRATORY (INHALATION) at 22:28

## 2023-05-15 RX ADMIN — Medication 125 MG: at 07:15

## 2023-05-15 RX ADMIN — SODIUM CHLORIDE, PRESERVATIVE FREE 10 ML: 5 INJECTION INTRAVENOUS at 20:56

## 2023-05-15 RX ADMIN — METRONIDAZOLE 500 MG: 500 INJECTION, SOLUTION INTRAVENOUS at 16:21

## 2023-05-15 RX ADMIN — SODIUM CHLORIDE: 9 INJECTION, SOLUTION INTRAVENOUS at 13:19

## 2023-05-15 RX ADMIN — METRONIDAZOLE 500 MG: 500 INJECTION, SOLUTION INTRAVENOUS at 05:41

## 2023-05-15 RX ADMIN — SODIUM CHLORIDE 40 MG: 9 INJECTION INTRAMUSCULAR; INTRAVENOUS; SUBCUTANEOUS at 20:55

## 2023-05-15 RX ADMIN — CEFTRIAXONE SODIUM 1000 MG: 1 INJECTION, POWDER, FOR SOLUTION INTRAMUSCULAR; INTRAVENOUS at 16:19

## 2023-05-15 RX ADMIN — KETOROLAC TROMETHAMINE 30 MG: 30 INJECTION, SOLUTION INTRAMUSCULAR; INTRAVENOUS at 06:01

## 2023-05-15 RX ADMIN — OXYCODONE HYDROCHLORIDE AND ACETAMINOPHEN 1 TABLET: 5; 325 TABLET ORAL at 16:36

## 2023-05-15 RX ADMIN — SODIUM CHLORIDE, PRESERVATIVE FREE 10 ML: 5 INJECTION INTRAVENOUS at 08:37

## 2023-05-15 ASSESSMENT — PAIN DESCRIPTION - LOCATION
LOCATION: ABDOMEN

## 2023-05-15 ASSESSMENT — PAIN DESCRIPTION - ORIENTATION
ORIENTATION: LOWER
ORIENTATION: RIGHT;LEFT;LOWER

## 2023-05-15 ASSESSMENT — PAIN SCALES - GENERAL
PAINLEVEL_OUTOF10: 7
PAINLEVEL_OUTOF10: 4
PAINLEVEL_OUTOF10: 4

## 2023-05-15 ASSESSMENT — PAIN DESCRIPTION - DESCRIPTORS
DESCRIPTORS: ACHING;DISCOMFORT;CRAMPING;BURNING
DESCRIPTORS: CRAMPING
DESCRIPTORS: ACHING

## 2023-05-15 NOTE — CONSULTS
Patient Active Hospital Problem List:   Principal Problem:    Colitis  Resolved Problems:    * No resolved hospital problems.  CASSIUS Maloney - NP

## 2023-05-15 NOTE — PLAN OF CARE
Problem: Discharge Planning  Goal: Discharge to home or other facility with appropriate resources  Outcome: Progressing  Flowsheets (Taken 5/15/2023 0820)  Discharge to home or other facility with appropriate resources: Identify barriers to discharge with patient and caregiver     Problem: Pain  Goal: Verbalizes/displays adequate comfort level or baseline comfort level  Outcome: Progressing  Flowsheets (Taken 5/15/2023 0820)  Verbalizes/displays adequate comfort level or baseline comfort level: Assess pain using appropriate pain scale

## 2023-05-15 NOTE — ED NOTES
TRANSFER - OUT REPORT:    Verbal report given to [facility] on Bridget Marshall  being transferred to  for routine progression of patient care       Report consisted of patient's Situation, Background, Assessment and   Recommendations(SBAR). Information from the following report(s) Nurse Handoff Report, ED Encounter Summary, ED SBAR, Adult Overview, MAR, Recent Results, Med Rec Status, and Cardiac Rhythm sINUS   was reviewed with the receiving nurse. Jermyn Assessment: No data recorded  Lines:   Peripheral IV 05/14/23 Right Antecubital (Active)   Site Assessment Clean, dry & intact 05/14/23 1311   Line Status Blood return noted; Flushed 05/14/23 1311   Phlebitis Assessment No symptoms 05/14/23 1311   Infiltration Assessment 0 05/14/23 1311   Dressing Status Clean, dry & intact 05/14/23 1311   Dressing Type Transparent 05/14/23 1311       Peripheral IV 05/14/23 Left Antecubital (Active)   Site Assessment Clean, dry & intact 05/14/23 1330   Line Status Blood return noted 05/14/23 1330   Phlebitis Assessment No symptoms 05/14/23 1330   Infiltration Assessment 0 05/14/23 1330   Dressing Status Clean, dry & intact 05/14/23 1330        Opportunity for questions and clarification was provided.       Patient transported with:  Albaro Barahona RN  05/15/23 0011

## 2023-05-15 NOTE — CARE COORDINATION
Case Management Assessment  Initial Evaluation    Noted, GI consult pending. The patient plans to discharge home when stable for discharge. No CM needs or orders at this time. CM following for discharge needs. Date/Time of Evaluation: 5/15/2023 3:42 PM  Assessment Completed by: Mike Griffiths RN    If patient is discharged prior to next notation, then this note serves as note for discharge by case management. Patient Name: Justin Shirley                   YOB: 1980  Diagnosis: Colitis [K52.9]  Septicemia ([address] [A41.9]  Acute febrile illness [R50.9]                   Date / Time: 5/14/2023 12:58 PM    Patient Admission Status: Inpatient   Readmission Risk (Low < 19, Mod (19-27), High > 27): Readmission Risk Score: 4.9    Current PCP: CASSIUS Culver NP  PCP verified by CM? Yes    Chart Reviewed: Yes      History Provided by: Patient  Patient Orientation: Alert and Oriented    Patient Cognition: Alert    Hospitalization in the last 30 days (Readmission):  No    If yes, Readmission Assessment in CM Navigator will be completed. Advance Directives:      Code Status: Full Code   Patient's Primary Decision Maker is: Legal Next of Kin      Discharge Planning:    Patient lives with:   Type of Home: House  Primary Care Giver: Self  Patient Support Systems include: None   Current Financial resources:    Current community resources: None  Current services prior to admission:              Current DME:              Type of Home Care services:       ADLS  Prior functional level: Independent in ADLs/IADLs  Current functional level: Independent in ADLs/IADLs    PT AM-PAC:   /24  OT AM-PAC:   /24    Family can provide assistance at DC: Yes  Would you like Case Management to discuss the discharge plan with any other family members/significant others, and if so, who?  No  Plans to Return to Present Housing: Yes  Other Identified Issues/Barriers to RETURNITNG to current housing: [facility]

## 2023-05-16 ENCOUNTER — CLINICAL DOCUMENTATION (OUTPATIENT)
Age: 43
End: 2023-05-16

## 2023-05-16 VITALS
BODY MASS INDEX: 33.36 KG/M2 | OXYGEN SATURATION: 95 % | WEIGHT: 239.2 LBS | DIASTOLIC BLOOD PRESSURE: 87 MMHG | TEMPERATURE: 98 F | RESPIRATION RATE: 16 BRPM | HEART RATE: 76 BPM | SYSTOLIC BLOOD PRESSURE: 144 MMHG

## 2023-05-16 LAB
ANION GAP SERPL CALC-SCNC: 5 MMOL/L (ref 5–15)
BASOPHILS # BLD: 0.1 K/UL (ref 0–0.1)
BASOPHILS NFR BLD: 1 % (ref 0–1)
BUN SERPL-MCNC: 8 MG/DL (ref 6–20)
BUN/CREAT SERPL: 10 (ref 12–20)
CALCIUM SERPL-MCNC: 8 MG/DL (ref 8.5–10.1)
CHLORIDE SERPL-SCNC: 112 MMOL/L (ref 97–108)
CO2 SERPL-SCNC: 22 MMOL/L (ref 21–32)
CREAT SERPL-MCNC: 0.81 MG/DL (ref 0.7–1.3)
DIFFERENTIAL METHOD BLD: ABNORMAL
EOSINOPHIL # BLD: 0.3 K/UL (ref 0–0.4)
EOSINOPHIL NFR BLD: 4 % (ref 0–7)
ERYTHROCYTE [DISTWIDTH] IN BLOOD BY AUTOMATED COUNT: 12.3 % (ref 11.5–14.5)
GLUCOSE SERPL-MCNC: 97 MG/DL (ref 65–100)
HCT VFR BLD AUTO: 38.4 % (ref 36.6–50.3)
HGB BLD-MCNC: 13.3 G/DL (ref 12.1–17)
IMM GRANULOCYTES # BLD AUTO: 0 K/UL
IMM GRANULOCYTES NFR BLD AUTO: 0 %
LYMPHOCYTES # BLD: 1.4 K/UL (ref 0.8–3.5)
LYMPHOCYTES NFR BLD: 20 % (ref 12–49)
MAGNESIUM SERPL-MCNC: 2 MG/DL (ref 1.6–2.4)
MCH RBC QN AUTO: 30.4 PG (ref 26–34)
MCHC RBC AUTO-ENTMCNC: 34.6 G/DL (ref 30–36.5)
MCV RBC AUTO: 87.7 FL (ref 80–99)
METAMYELOCYTES NFR BLD MANUAL: 1 %
MONOCYTES # BLD: 0.7 K/UL (ref 0–1)
MONOCYTES NFR BLD: 10 % (ref 5–13)
NEUTS BAND NFR BLD MANUAL: 4 % (ref 0–6)
NEUTS SEG # BLD: 4.6 K/UL (ref 1.8–8)
NEUTS SEG NFR BLD: 60 % (ref 32–75)
NRBC # BLD: 0 K/UL (ref 0–0.01)
NRBC BLD-RTO: 0 PER 100 WBC
PLATELET # BLD AUTO: 164 K/UL (ref 150–400)
PMV BLD AUTO: 8.9 FL (ref 8.9–12.9)
POTASSIUM SERPL-SCNC: 3.2 MMOL/L (ref 3.5–5.1)
RBC # BLD AUTO: 4.38 M/UL (ref 4.1–5.7)
RBC MORPH BLD: ABNORMAL
SODIUM SERPL-SCNC: 139 MMOL/L (ref 136–145)
WBC # BLD AUTO: 7.2 K/UL (ref 4.1–11.1)

## 2023-05-16 PROCEDURE — A4216 STERILE WATER/SALINE, 10 ML: HCPCS

## 2023-05-16 PROCEDURE — C9113 INJ PANTOPRAZOLE SODIUM, VIA: HCPCS

## 2023-05-16 PROCEDURE — 6370000000 HC RX 637 (ALT 250 FOR IP): Performed by: NURSE PRACTITIONER

## 2023-05-16 PROCEDURE — 2580000003 HC RX 258: Performed by: NURSE PRACTITIONER

## 2023-05-16 PROCEDURE — 6360000002 HC RX W HCPCS

## 2023-05-16 PROCEDURE — 85025 COMPLETE CBC W/AUTO DIFF WBC: CPT

## 2023-05-16 PROCEDURE — 80048 BASIC METABOLIC PNL TOTAL CA: CPT

## 2023-05-16 PROCEDURE — 2500000003 HC RX 250 WO HCPCS: Performed by: FAMILY MEDICINE

## 2023-05-16 PROCEDURE — 2580000003 HC RX 258: Performed by: FAMILY MEDICINE

## 2023-05-16 PROCEDURE — 6360000002 HC RX W HCPCS: Performed by: NURSE PRACTITIONER

## 2023-05-16 PROCEDURE — 6370000000 HC RX 637 (ALT 250 FOR IP): Performed by: FAMILY MEDICINE

## 2023-05-16 PROCEDURE — 36415 COLL VENOUS BLD VENIPUNCTURE: CPT

## 2023-05-16 PROCEDURE — 2580000003 HC RX 258

## 2023-05-16 PROCEDURE — 83735 ASSAY OF MAGNESIUM: CPT

## 2023-05-16 RX ORDER — AZITHROMYCIN 500 MG/1
500 TABLET, FILM COATED ORAL DAILY
Qty: 5 TABLET | Refills: 0 | Status: SHIPPED | OUTPATIENT
Start: 2023-05-16 | End: 2023-05-21

## 2023-05-16 RX ADMIN — SODIUM CHLORIDE, PRESERVATIVE FREE 10 ML: 5 INJECTION INTRAVENOUS at 10:03

## 2023-05-16 RX ADMIN — METRONIDAZOLE 500 MG: 500 INJECTION, SOLUTION INTRAVENOUS at 04:31

## 2023-05-16 RX ADMIN — SODIUM CHLORIDE 40 MG: 9 INJECTION INTRAMUSCULAR; INTRAVENOUS; SUBCUTANEOUS at 09:55

## 2023-05-16 RX ADMIN — MONTELUKAST 10 MG: 10 TABLET, FILM COATED ORAL at 09:56

## 2023-05-16 RX ADMIN — AZITHROMYCIN MONOHYDRATE 500 MG: 500 INJECTION, POWDER, LYOPHILIZED, FOR SOLUTION INTRAVENOUS at 09:56

## 2023-05-16 RX ADMIN — SODIUM CHLORIDE: 9 INJECTION, SOLUTION INTRAVENOUS at 00:10

## 2023-05-16 RX ADMIN — POTASSIUM BICARBONATE 50 MEQ: 977.5 TABLET, EFFERVESCENT ORAL at 12:04

## 2023-05-16 ASSESSMENT — PAIN DESCRIPTION - LOCATION: LOCATION: ABDOMEN

## 2023-05-16 ASSESSMENT — PAIN SCALES - GENERAL: PAINLEVEL_OUTOF10: 2

## 2023-05-16 ASSESSMENT — PAIN DESCRIPTION - DESCRIPTORS: DESCRIPTORS: DISCOMFORT

## 2023-05-16 NOTE — PROGRESS NOTES
[facility].  [address] [facility] 140 [facility], [address]   [phone]                     GI PROGRESS NOTE    Patient Name: Marquis Arguelles      : 1980      MRN: [id number]  [facility] Date: 2023  Today's Date: 2023  CC: colitis/diarrhea     Subjective:     Mr. Savanna Kiran is sitting up comfortably in bed. Continues with abdominal discomfort. Less frequent BM- endorses 5 overnight. Tolerating CLD with no N/V or increase in abdominal pain. Objective:     Blood pressure (!) 144/87, pulse 76, temperature 98 °F (36.7 °C), temperature source Oral, resp. rate 16, weight 108.5 kg (239 lb 3.2 oz), SpO2 95 %. Physical Exam:  General appearance: cooperative, no distress, appears stated age  Skin: Extremities and face reveal no rashes. HEENT: Sclerae anicteric. Cardiovascular: Regular rate and rhythm. Respiratory: Comfortable breathing with no accessory muscle use. GI: Abdomen nondistended, soft, and TTP. Normal active bowel sounds. Rectal: Deferred   Musculoskeletal: No pitting edema of the lower legs. Neurological:  Patient is alert and oriented. Psychiatric:No anxiety or agitation.     Data Review:    Recent Results (from the past 24 hour(s))   Enteric Bact Panel, DNA    Collection Time: 05/15/23  4:24 PM    Specimen: Stool   Result Value Ref Range    SHIGELLA SPECIES, DNA Negative NEG      CAMPYLOBACTER SPECIES, DNA Positive (A) NEG      VIBRIO SPECIES, DNA Negative NEG      ENTEROTOXIGEN E COLI, DNA Negative NEG      SHIGA TOXIN PRODUCING, DNA Negative NEG      SALMONELLA SPECIES, DNA Negative NEG      P. SHIGELLOIDES, DNA Negative NEG      Y. ENTEROCOLITICA, DNA Negative NEG     CBC with Auto Differential    Collection Time: 23  4:34 AM   Result Value Ref Range    WBC 7.2 4.1 - 11.1 K/uL    RBC 4.38 4.10 - 5.70 M/uL    Hemoglobin 13.3 12.1 - 17.0 g/dL    Hematocrit 38.4 36.6 - 50.3 %    MCV 87.7 80.0 - 99.0 FL    MCH 30.4 26.0 - 34.0 PG    MCHC 34.6 30.0 - 36.5
This RN placed GI consult. Consult in.
This RN went over discharge instructions with patient. Patient had the opportunity to ask any questions. Patient did not have any questions. Patient discharged with all personal belongings.
for input(s): CPK in the last 72 hours.     Invalid input(s): CPKMB, CKNDX, TROIQ  Lab Results   Component Value Date/Time    CHOL 197 01/08/2020 11:51 AM    HDL 56 01/08/2020 11:51 AM     No results found for: GLUCPOC  [unfilled]      Medications Reviewed:     Current Facility-Administered Medications   Medication Dose Route Frequency    0.9 % sodium chloride infusion   IntraVENous Continuous    acetaminophen (TYLENOL) tablet 650 mg  650 mg Oral Q4H PRN    metronidazole (FLAGYL) 500 mg in 0.9% NaCl 100 mL IVPB premix  500 mg IntraVENous Q12H    cefTRIAXone (ROCEPHIN) 1,000 mg in sterile water 10 mL IV syringe  1,000 mg IntraVENous Q24H    oxyCODONE-acetaminophen (PERCOCET) 5-325 MG per tablet 1 tablet  1 tablet Oral Q6H PRN    vancomycin (FIRVANQ) 50 MG/ML oral solution 125 mg  125 mg Oral 4 times per day    sodium chloride flush 0.9 % injection 5-40 mL  5-40 mL IntraVENous 2 times per day    sodium chloride flush 0.9 % injection 5-40 mL  5-40 mL IntraVENous PRN    0.9 % sodium chloride infusion   IntraVENous PRN    ketorolac (TORADOL) injection 30 mg  30 mg IntraVENous Q6H PRN     ______________________________________________________________________  EXPECTED LENGTH OF STAY: Unable to retrieve estimated LOS  ACTUAL LENGTH OF STAY:          4121 CASSIUS Russell NP

## 2023-05-16 NOTE — DISCHARGE INSTRUCTIONS
Discharge Instructions       PATIENT ID: Yoko Leija  MRN: [id number]   YOB: 1980    DATE OF ADMISSION: 5/14/2023   DATE OF DISCHARGE: 5/16/2023    PRIMARY CARE PROVIDER: Ming Cook     ATTENDING PHYSICIAN: Al Taveras MD   DISCHARGING PROVIDER: CASSIUS Pierre NP    To contact this individual call [phone] and ask the  to page. If unavailable ask to be transferred the Adult Hospitalist Department. DISCHARGE DIAGNOSES colitis    CONSULTATIONS: GI    PROCEDURES/SURGERIES: * No surgery found *    PENDING TEST RESULTS:   At the time of discharge the following test results are still pending:     FOLLOW UP APPOINTMENTS:   Follow up with PCP in1 week    ADDITIONAL CARE RECOMMENDATIONS:     DIET: regular diet      ACTIVITY: activity as tolerated    WOUND CARE: na    EQUIPMENT needed: na      DISCHARGE MEDICATIONS:   See Medication Reconciliation Form    It is important that you take the medication exactly as they are prescribed. Keep your medication in the bottles provided by the pharmacist and keep a list of the medication names, dosages, and times to be taken in your wallet. Do not take other medications without consulting your doctor. NOTIFY YOUR PHYSICIAN FOR ANY OF THE FOLLOWING:   Fever over 101 degrees for 24 hours. Chest pain, shortness of breath, fever, chills, nausea, vomiting, diarrhea, change in mentation, falling, weakness, bleeding. Severe pain or pain not relieved by medications. Or, any other signs or symptoms that you may have questions about.       DISPOSITION:   x Home With:   OT  PT  HH  RN       SNF/Inpatient Rehab/LTAC    Independent/assisted living    Hospice    Other:           Signed:   CASSIUS Pierre NP  5/16/2023  1:15 PM

## 2023-05-16 NOTE — DISCHARGE SUMMARY
Discharge Summary       PATIENT ID: Anna Christine  MRN: [id number]   YOB: 1980    DATE OF ADMISSION: 5/14/2023 12:58 PM    DATE OF DISCHARGE: 5/16/2023   PRIMARY CARE PROVIDER: CASSIUS Snowden NP     ATTENDING PHYSICIAN: Lorraine Elizabeth MD  DISCHARGING PROVIDER: CASSIUS Eden NP    To contact this individual call [phone] and ask the  to page. If unavailable ask to be transferred the Adult Hospitalist Department. CONSULTATIONS: IP CONSULT TO GI  IP CONSULT TO HOSPITALIST    PROCEDURES/SURGERIES: * No surgery found *     ADMITTING [address] [place] [address] COURSE:   Per H&P, Anna Christine is a 41 y.o. male with a pmhx eosinophilic esophagitis, Htn, anemia, and recent H. pylori who presents with abdominal pain, and fever, and is being admitted for colitis. He recently had a H. Pylori infection who presents with fever, abodminal pain, and diarrhea for 3 days, and is being admitted for colitis. Associated sx include decreased urine output.    -He reports several days of watery diarrhea occurring at a frequency of every 30 mintues  In the ED, he was febrile to 102.2, and tachycardic to 128. Other Vss. Labs showed WBC 14.3, and  . CT abdomen/pelvis  showed non-specific diffuse colitis.      IN the ED, he received flagyl, and ceftriaxone        DISCHARGE DIAGNOSES / PLAN:      Colitis  As seen on CT scan, diffuse colitis  Stool was Clostridium negative, discontinuing oral vancomycin  Blood cultures no growth so far  Is received metronidazole and ceftriaxone which I am discontinuing  Enteric panel was negative with the exception of Campylobacter,  Starting azithromycin  Urinalysis unremarkable  GI has evaluated, appreciate recommendations     Sepsis  Code sepsis was called, did have fever, leukocytosis, tachycardia however no lactic acidosis  Due to the above  Continuing the aforementioned treatments                    asthma  Stable, not in exacerbation  Continuing

## 2023-05-19 LAB
BACTERIA SPEC CULT: NORMAL
BACTERIA SPEC CULT: NORMAL
SERVICE CMNT-IMP: NORMAL
SERVICE CMNT-IMP: NORMAL

## 2023-06-22 DIAGNOSIS — J45.41 MODERATE PERSISTENT ASTHMA WITH (ACUTE) EXACERBATION: ICD-10-CM

## 2023-06-22 RX ORDER — ALBUTEROL SULFATE 90 UG/1
AEROSOL, METERED RESPIRATORY (INHALATION)
Qty: 6.7 EACH | Refills: 2 | Status: SHIPPED | OUTPATIENT
Start: 2023-06-22

## 2023-06-27 ENCOUNTER — TELEPHONE (OUTPATIENT)
Age: 43
End: 2023-06-27

## 2023-06-27 DIAGNOSIS — J45.20 MILD INTERMITTENT ASTHMA, UNCOMPLICATED: ICD-10-CM

## 2023-06-27 DIAGNOSIS — R06.81 APNEA, NOT ELSEWHERE CLASSIFIED: ICD-10-CM

## 2023-06-27 RX ORDER — BUDESONIDE AND FORMOTEROL FUMARATE DIHYDRATE 160; 4.5 UG/1; UG/1
AEROSOL RESPIRATORY (INHALATION)
Qty: 10.2 EACH | Refills: 1 | Status: SHIPPED | OUTPATIENT
Start: 2023-06-27

## 2023-07-05 DIAGNOSIS — R06.81 APNEA, NOT ELSEWHERE CLASSIFIED: ICD-10-CM

## 2023-07-05 DIAGNOSIS — J45.20 MILD INTERMITTENT ASTHMA, UNCOMPLICATED: ICD-10-CM

## 2023-07-05 RX ORDER — BUDESONIDE AND FORMOTEROL FUMARATE DIHYDRATE 160; 4.5 UG/1; UG/1
2 AEROSOL RESPIRATORY (INHALATION) 2 TIMES DAILY
Qty: 10.2 G | Refills: 3 | Status: SHIPPED | OUTPATIENT
Start: 2023-07-05

## 2024-04-15 ENCOUNTER — APPOINTMENT (OUTPATIENT)
Facility: HOSPITAL | Age: 44
End: 2024-04-15
Payer: COMMERCIAL

## 2024-04-15 ENCOUNTER — HOSPITAL ENCOUNTER (EMERGENCY)
Facility: HOSPITAL | Age: 44
Discharge: HOME | End: 2024-04-15
Attending: EMERGENCY MEDICINE
Payer: COMMERCIAL

## 2024-04-15 VITALS
TEMPERATURE: 97.5 F | SYSTOLIC BLOOD PRESSURE: 151 MMHG | DIASTOLIC BLOOD PRESSURE: 97 MMHG | RESPIRATION RATE: 12 BRPM | HEART RATE: 79 BPM | OXYGEN SATURATION: 95 %

## 2024-04-15 DIAGNOSIS — R59.0 MEDIASTINAL LYMPHADENOPATHY: ICD-10-CM

## 2024-04-15 DIAGNOSIS — J18.9 PNEUMONIA OF BOTH LUNGS DUE TO INFECTIOUS ORGANISM, UNSPECIFIED PART OF LUNG: Primary | ICD-10-CM

## 2024-04-15 LAB
ALBUMIN SERPL-MCNC: 3.7 G/DL (ref 3.5–5)
ALBUMIN/GLOB SERPL: 0.9 (ref 1.1–2.2)
ALP SERPL-CCNC: 54 U/L (ref 45–117)
ALT SERPL-CCNC: 96 U/L (ref 12–78)
ANION GAP SERPL CALC-SCNC: 9 MMOL/L (ref 5–15)
AST SERPL-CCNC: 43 U/L (ref 15–37)
BASOPHILS # BLD: 0 K/UL (ref 0–0.1)
BASOPHILS NFR BLD: 1 % (ref 0–1)
BILIRUB SERPL-MCNC: 0.5 MG/DL (ref 0.2–1)
BUN SERPL-MCNC: 14 MG/DL (ref 6–20)
BUN/CREAT SERPL: 14 (ref 12–20)
CALCIUM SERPL-MCNC: 9.1 MG/DL (ref 8.5–10.1)
CHLORIDE SERPL-SCNC: 110 MMOL/L (ref 97–108)
CO2 SERPL-SCNC: 21 MMOL/L (ref 21–32)
COMMENT:: NORMAL
CREAT SERPL-MCNC: 0.98 MG/DL (ref 0.7–1.3)
D DIMER PPP FEU-MCNC: 0.93 MG/L FEU (ref 0–0.65)
DIFFERENTIAL METHOD BLD: NORMAL
EKG ATRIAL RATE: 94 BPM
EKG DIAGNOSIS: NORMAL
EKG P AXIS: 56 DEGREES
EKG P-R INTERVAL: 152 MS
EKG Q-T INTERVAL: 342 MS
EKG QRS DURATION: 80 MS
EKG QTC CALCULATION (BAZETT): 427 MS
EKG R AXIS: 73 DEGREES
EKG T AXIS: 52 DEGREES
EKG VENTRICULAR RATE: 94 BPM
EOSINOPHIL # BLD: 0 K/UL (ref 0–0.4)
EOSINOPHIL NFR BLD: 0 % (ref 0–7)
ERYTHROCYTE [DISTWIDTH] IN BLOOD BY AUTOMATED COUNT: 12.8 % (ref 11.5–14.5)
FLUAV AG NPH QL IA: NEGATIVE
FLUBV AG NOSE QL IA: NEGATIVE
GLOBULIN SER CALC-MCNC: 3.9 G/DL (ref 2–4)
GLUCOSE SERPL-MCNC: 108 MG/DL (ref 65–100)
HCT VFR BLD AUTO: 44.2 % (ref 36.6–50.3)
HGB BLD-MCNC: 15.8 G/DL (ref 12.1–17)
IMM GRANULOCYTES # BLD AUTO: 0 K/UL (ref 0–0.04)
IMM GRANULOCYTES NFR BLD AUTO: 0 % (ref 0–0.5)
LYMPHOCYTES # BLD: 1.2 K/UL (ref 0.8–3.5)
LYMPHOCYTES NFR BLD: 22 % (ref 12–49)
MCH RBC QN AUTO: 31 PG (ref 26–34)
MCHC RBC AUTO-ENTMCNC: 35.7 G/DL (ref 30–36.5)
MCV RBC AUTO: 86.8 FL (ref 80–99)
MONOCYTES # BLD: 0.6 K/UL (ref 0–1)
MONOCYTES NFR BLD: 11 % (ref 5–13)
NEUTS SEG # BLD: 3.7 K/UL (ref 1.8–8)
NEUTS SEG NFR BLD: 66 % (ref 32–75)
NRBC # BLD: 0 K/UL (ref 0–0.01)
NRBC BLD-RTO: 0 PER 100 WBC
NT PRO BNP: 14 PG/ML
PLATELET # BLD AUTO: 158 K/UL (ref 150–400)
PMV BLD AUTO: 9 FL (ref 8.9–12.9)
POTASSIUM SERPL-SCNC: 3.7 MMOL/L (ref 3.5–5.1)
PROT SERPL-MCNC: 7.6 G/DL (ref 6.4–8.2)
RBC # BLD AUTO: 5.09 M/UL (ref 4.1–5.7)
SARS-COV-2 RDRP RESP QL NAA+PROBE: NOT DETECTED
SODIUM SERPL-SCNC: 140 MMOL/L (ref 136–145)
SOURCE: NORMAL
SPECIMEN HOLD: NORMAL
TROPONIN I SERPL HS-MCNC: 5 NG/L (ref 0–76)
WBC # BLD AUTO: 5.6 K/UL (ref 4.1–11.1)

## 2024-04-15 PROCEDURE — 93005 ELECTROCARDIOGRAM TRACING: CPT | Performed by: STUDENT IN AN ORGANIZED HEALTH CARE EDUCATION/TRAINING PROGRAM

## 2024-04-15 PROCEDURE — 85025 COMPLETE CBC W/AUTO DIFF WBC: CPT

## 2024-04-15 PROCEDURE — 6370000000 HC RX 637 (ALT 250 FOR IP): Performed by: EMERGENCY MEDICINE

## 2024-04-15 PROCEDURE — 2580000003 HC RX 258: Performed by: STUDENT IN AN ORGANIZED HEALTH CARE EDUCATION/TRAINING PROGRAM

## 2024-04-15 PROCEDURE — 71275 CT ANGIOGRAPHY CHEST: CPT

## 2024-04-15 PROCEDURE — 85379 FIBRIN DEGRADATION QUANT: CPT

## 2024-04-15 PROCEDURE — 6360000004 HC RX CONTRAST MEDICATION

## 2024-04-15 PROCEDURE — 87635 SARS-COV-2 COVID-19 AMP PRB: CPT

## 2024-04-15 PROCEDURE — 87804 INFLUENZA ASSAY W/OPTIC: CPT

## 2024-04-15 PROCEDURE — 99285 EMERGENCY DEPT VISIT HI MDM: CPT

## 2024-04-15 PROCEDURE — 6370000000 HC RX 637 (ALT 250 FOR IP): Performed by: STUDENT IN AN ORGANIZED HEALTH CARE EDUCATION/TRAINING PROGRAM

## 2024-04-15 PROCEDURE — 36415 COLL VENOUS BLD VENIPUNCTURE: CPT

## 2024-04-15 PROCEDURE — 84484 ASSAY OF TROPONIN QUANT: CPT

## 2024-04-15 PROCEDURE — 83880 ASSAY OF NATRIURETIC PEPTIDE: CPT

## 2024-04-15 PROCEDURE — 80053 COMPREHEN METABOLIC PANEL: CPT

## 2024-04-15 PROCEDURE — 71045 X-RAY EXAM CHEST 1 VIEW: CPT

## 2024-04-15 PROCEDURE — 6360000004 HC RX CONTRAST MEDICATION: Performed by: RADIOLOGY

## 2024-04-15 PROCEDURE — 96374 THER/PROPH/DIAG INJ IV PUSH: CPT

## 2024-04-15 PROCEDURE — 6360000002 HC RX W HCPCS: Performed by: STUDENT IN AN ORGANIZED HEALTH CARE EDUCATION/TRAINING PROGRAM

## 2024-04-15 RX ORDER — AMOXICILLIN AND CLAVULANATE POTASSIUM 875; 125 MG/1; MG/1
1 TABLET, FILM COATED ORAL 2 TIMES DAILY
Qty: 14 TABLET | Refills: 0 | Status: SHIPPED | OUTPATIENT
Start: 2024-04-15 | End: 2024-04-22

## 2024-04-15 RX ORDER — ALBUTEROL SULFATE 2.5 MG/3ML
2.5 SOLUTION RESPIRATORY (INHALATION) ONCE
Status: COMPLETED | OUTPATIENT
Start: 2024-04-15 | End: 2024-04-15

## 2024-04-15 RX ORDER — PREDNISONE 50 MG/1
50 TABLET ORAL DAILY
Qty: 5 TABLET | Refills: 0 | Status: SHIPPED | OUTPATIENT
Start: 2024-04-15 | End: 2024-04-20

## 2024-04-15 RX ORDER — 0.9 % SODIUM CHLORIDE 0.9 %
1000 INTRAVENOUS SOLUTION INTRAVENOUS ONCE
Status: COMPLETED | OUTPATIENT
Start: 2024-04-15 | End: 2024-04-15

## 2024-04-15 RX ORDER — AZITHROMYCIN 250 MG/1
TABLET, FILM COATED ORAL
Qty: 6 TABLET | Refills: 0 | Status: SHIPPED | OUTPATIENT
Start: 2024-04-15 | End: 2024-04-25

## 2024-04-15 RX ORDER — KETOROLAC TROMETHAMINE 30 MG/ML
15 INJECTION, SOLUTION INTRAMUSCULAR; INTRAVENOUS ONCE
Status: COMPLETED | OUTPATIENT
Start: 2024-04-15 | End: 2024-04-15

## 2024-04-15 RX ORDER — IPRATROPIUM BROMIDE AND ALBUTEROL SULFATE 2.5; .5 MG/3ML; MG/3ML
1 SOLUTION RESPIRATORY (INHALATION)
Status: COMPLETED | OUTPATIENT
Start: 2024-04-15 | End: 2024-04-15

## 2024-04-15 RX ADMIN — PREDNISONE 50 MG: 20 TABLET ORAL at 00:48

## 2024-04-15 RX ADMIN — IOPAMIDOL 80 ML: 755 INJECTION, SOLUTION INTRAVENOUS at 04:01

## 2024-04-15 RX ADMIN — IPRATROPIUM BROMIDE AND ALBUTEROL SULFATE 1 DOSE: .5; 3 SOLUTION RESPIRATORY (INHALATION) at 01:28

## 2024-04-15 RX ADMIN — KETOROLAC TROMETHAMINE 15 MG: 30 INJECTION, SOLUTION INTRAMUSCULAR; INTRAVENOUS at 01:06

## 2024-04-15 RX ADMIN — IPRATROPIUM BROMIDE AND ALBUTEROL SULFATE 1 DOSE: .5; 3 SOLUTION RESPIRATORY (INHALATION) at 00:49

## 2024-04-15 RX ADMIN — ALBUTEROL SULFATE 2.5 MG: 2.5 SOLUTION RESPIRATORY (INHALATION) at 00:49

## 2024-04-15 RX ADMIN — SODIUM CHLORIDE 1000 ML: 9 INJECTION, SOLUTION INTRAVENOUS at 01:06

## 2024-04-15 ASSESSMENT — PAIN - FUNCTIONAL ASSESSMENT
PAIN_FUNCTIONAL_ASSESSMENT: ACTIVITIES ARE NOT PREVENTED
PAIN_FUNCTIONAL_ASSESSMENT: 0-10

## 2024-04-15 ASSESSMENT — ENCOUNTER SYMPTOMS
COUGH: 1
WHEEZING: 1
SHORTNESS OF BREATH: 1

## 2024-04-15 ASSESSMENT — PAIN DESCRIPTION - DESCRIPTORS: DESCRIPTORS: BURNING;ACHING

## 2024-04-15 ASSESSMENT — PAIN SCALES - GENERAL: PAINLEVEL_OUTOF10: 3

## 2024-04-15 ASSESSMENT — PAIN DESCRIPTION - PAIN TYPE: TYPE: ACUTE PAIN

## 2024-04-15 ASSESSMENT — PAIN DESCRIPTION - FREQUENCY: FREQUENCY: INTERMITTENT

## 2024-04-15 ASSESSMENT — PAIN DESCRIPTION - ORIENTATION: ORIENTATION: MID

## 2024-04-15 ASSESSMENT — PAIN DESCRIPTION - ONSET: ONSET: PROGRESSIVE

## 2024-04-15 ASSESSMENT — PAIN DESCRIPTION - LOCATION: LOCATION: CHEST

## 2024-04-15 NOTE — DISCHARGE INSTRUCTIONS
Please follow-up with your pulmonologist in 1 week for bilateral pneumonia and mediastinal lymphadenopathy

## 2024-04-15 NOTE — ED TRIAGE NOTES
Patient arrives with a CC of SOB that started Friday. Stated he has been feeling sick with fevers, cough, n/v/d, and chest congestion. Took ibuprofen around 10p and theraflu around 4p. Also gave himself a neb treatment earlier today. Denies relief with medications.

## 2024-04-15 NOTE — ED PROVIDER NOTES
Yes     Alcohol/week: 1.0 standard drink of alcohol    Drug use: No         PHYSICAL EXAM       ED Triage Vitals [04/15/24 0026]   BP Temp Temp Source Pulse Respirations SpO2 Height Weight   (!) 151/97 97.5 °F (36.4 °C) Oral 95 19 95 % -- --       There is no height or weight on file to calculate BMI.    Physical Exam  Vitals and nursing note reviewed.   Constitutional:       General: He is not in acute distress.     Appearance: Normal appearance.   HENT:      Head: Normocephalic and atraumatic.      Mouth/Throat:      Mouth: Mucous membranes are moist.      Pharynx: Oropharynx is clear. No oropharyngeal exudate.   Eyes:      Extraocular Movements: Extraocular movements intact.      Pupils: Pupils are equal, round, and reactive to light.   Cardiovascular:      Rate and Rhythm: Normal rate and regular rhythm.   Pulmonary:      Effort: Pulmonary effort is normal.      Breath sounds: Wheezing and rhonchi present.   Abdominal:      General: Abdomen is flat.      Palpations: Abdomen is soft.      Tenderness: There is no abdominal tenderness.   Musculoskeletal:         General: No swelling or deformity. Normal range of motion.      Cervical back: Normal range of motion.   Skin:     General: Skin is warm and dry.      Findings: No rash.   Neurological:      General: No focal deficit present.      Mental Status: He is alert and oriented to person, place, and time.      Cranial Nerves: No cranial nerve deficit.   Psychiatric:         Mood and Affect: Mood normal.             EMERGENCY DEPARTMENT COURSE and DIFFERENTIAL DIAGNOSIS/MDM:   Vitals:    Vitals:    04/15/24 0026   BP: (!) 151/97   Pulse: 95   Resp: 19   Temp: 97.5 °F (36.4 °C)   TempSrc: Oral   SpO2: 95%         Medical Decision Making  Patient had a syncopal event while he was getting his IV done.  He had a comprehensive workup including a CT of the chest for elevated D-dimer result.  He has bilateral infiltrates suggestive of pneumonia.  Will prescribe him